# Patient Record
Sex: MALE | Race: WHITE | NOT HISPANIC OR LATINO | Employment: PART TIME | URBAN - METROPOLITAN AREA
[De-identification: names, ages, dates, MRNs, and addresses within clinical notes are randomized per-mention and may not be internally consistent; named-entity substitution may affect disease eponyms.]

---

## 2017-01-16 ENCOUNTER — OFFICE VISIT - HEALTHEAST (OUTPATIENT)
Dept: VASCULAR SURGERY | Facility: CLINIC | Age: 37
End: 2017-01-16

## 2017-01-16 DIAGNOSIS — I87.2 VENOUS INSUFFICIENCY: ICD-10-CM

## 2017-01-16 DIAGNOSIS — I83.813 VARICOSE VEINS WITH PAIN, BILATERAL: ICD-10-CM

## 2017-01-16 ASSESSMENT — MIFFLIN-ST. JEOR: SCORE: 2106.94

## 2017-04-24 ENCOUNTER — AMBULATORY - HEALTHEAST (OUTPATIENT)
Dept: VASCULAR SURGERY | Facility: CLINIC | Age: 37
End: 2017-04-24

## 2017-04-24 ENCOUNTER — RECORDS - HEALTHEAST (OUTPATIENT)
Dept: VASCULAR ULTRASOUND | Facility: CLINIC | Age: 37
End: 2017-04-24

## 2017-04-24 ENCOUNTER — OFFICE VISIT - HEALTHEAST (OUTPATIENT)
Dept: VASCULAR SURGERY | Facility: CLINIC | Age: 37
End: 2017-04-24

## 2017-04-24 ENCOUNTER — RECORDS - HEALTHEAST (OUTPATIENT)
Dept: ADMINISTRATIVE | Facility: OTHER | Age: 37
End: 2017-04-24

## 2017-04-24 ENCOUNTER — COMMUNICATION - HEALTHEAST (OUTPATIENT)
Dept: VASCULAR SURGERY | Facility: CLINIC | Age: 37
End: 2017-04-24

## 2017-04-24 DIAGNOSIS — I87.2 VENOUS INSUFFICIENCY: ICD-10-CM

## 2017-04-24 DIAGNOSIS — I87.2 VENOUS INSUFFICIENCY (CHRONIC) (PERIPHERAL): ICD-10-CM

## 2017-04-24 DIAGNOSIS — I83.813 VARICOSE VEINS OF BILATERAL LOWER EXTREMITIES WITH PAIN: ICD-10-CM

## 2017-04-24 DIAGNOSIS — I83.813 VARICOSE VEINS WITH PAIN, BILATERAL: ICD-10-CM

## 2017-04-24 ASSESSMENT — MIFFLIN-ST. JEOR: SCORE: 2106.94

## 2017-04-25 ENCOUNTER — AMBULATORY - HEALTHEAST (OUTPATIENT)
Dept: VASCULAR SURGERY | Facility: CLINIC | Age: 37
End: 2017-04-25

## 2017-04-26 ENCOUNTER — COMMUNICATION - HEALTHEAST (OUTPATIENT)
Dept: VASCULAR SURGERY | Facility: CLINIC | Age: 37
End: 2017-04-26

## 2017-05-01 ENCOUNTER — AMBULATORY - HEALTHEAST (OUTPATIENT)
Dept: VASCULAR SURGERY | Facility: CLINIC | Age: 37
End: 2017-05-01

## 2017-05-02 ENCOUNTER — COMMUNICATION - HEALTHEAST (OUTPATIENT)
Dept: VASCULAR SURGERY | Facility: CLINIC | Age: 37
End: 2017-05-02

## 2017-05-08 ENCOUNTER — RECORDS - HEALTHEAST (OUTPATIENT)
Dept: ADMINISTRATIVE | Facility: OTHER | Age: 37
End: 2017-05-08

## 2017-05-11 ENCOUNTER — RECORDS - HEALTHEAST (OUTPATIENT)
Dept: ADMINISTRATIVE | Facility: OTHER | Age: 37
End: 2017-05-11

## 2017-05-11 ENCOUNTER — RECORDS - HEALTHEAST (OUTPATIENT)
Dept: VASCULAR ULTRASOUND | Facility: CLINIC | Age: 37
End: 2017-05-11

## 2017-05-11 DIAGNOSIS — I83.813 VARICOSE VEINS OF BILATERAL LOWER EXTREMITIES WITH PAIN: ICD-10-CM

## 2017-05-11 DIAGNOSIS — I87.2 VENOUS INSUFFICIENCY (CHRONIC) (PERIPHERAL): ICD-10-CM

## 2017-05-15 ENCOUNTER — COMMUNICATION - HEALTHEAST (OUTPATIENT)
Dept: VASCULAR SURGERY | Facility: CLINIC | Age: 37
End: 2017-05-15

## 2017-05-16 ENCOUNTER — AMBULATORY - HEALTHEAST (OUTPATIENT)
Dept: VASCULAR SURGERY | Facility: CLINIC | Age: 37
End: 2017-05-16

## 2017-05-22 ENCOUNTER — AMBULATORY - HEALTHEAST (OUTPATIENT)
Dept: VASCULAR SURGERY | Facility: CLINIC | Age: 37
End: 2017-05-22

## 2017-09-19 ENCOUNTER — OFFICE VISIT - HEALTHEAST (OUTPATIENT)
Dept: VASCULAR SURGERY | Facility: CLINIC | Age: 37
End: 2017-09-19

## 2017-09-19 DIAGNOSIS — I83.893 SYMPTOMATIC VARICOSE VEINS OF BOTH LOWER EXTREMITIES: ICD-10-CM

## 2017-09-19 DIAGNOSIS — I87.2 VENOUS INSUFFICIENCY OF BOTH LOWER EXTREMITIES: ICD-10-CM

## 2018-06-05 ENCOUNTER — COMMUNICATION - HEALTHEAST (OUTPATIENT)
Dept: VASCULAR SURGERY | Facility: CLINIC | Age: 38
End: 2018-06-05

## 2018-08-20 ENCOUNTER — RECORDS - HEALTHEAST (OUTPATIENT)
Dept: ADMINISTRATIVE | Facility: OTHER | Age: 38
End: 2018-08-20

## 2019-05-30 ENCOUNTER — COMMUNICATION - HEALTHEAST (OUTPATIENT)
Dept: VASCULAR SURGERY | Facility: CLINIC | Age: 39
End: 2019-05-30

## 2019-06-04 ENCOUNTER — OFFICE VISIT - HEALTHEAST (OUTPATIENT)
Dept: VASCULAR SURGERY | Facility: CLINIC | Age: 39
End: 2019-06-04

## 2019-06-04 DIAGNOSIS — I83.893 SYMPTOMATIC VARICOSE VEINS OF BOTH LOWER EXTREMITIES: ICD-10-CM

## 2019-06-04 ASSESSMENT — MIFFLIN-ST. JEOR: SCORE: 2084.26

## 2019-06-13 ENCOUNTER — RECORDS - HEALTHEAST (OUTPATIENT)
Dept: LAB | Facility: CLINIC | Age: 39
End: 2019-06-13

## 2019-06-13 LAB
ANION GAP SERPL CALCULATED.3IONS-SCNC: 9 MMOL/L (ref 5–18)
BUN SERPL-MCNC: 14 MG/DL (ref 8–22)
CALCIUM SERPL-MCNC: 10.4 MG/DL (ref 8.5–10.5)
CHLORIDE BLD-SCNC: 101 MMOL/L (ref 98–107)
CHOLEST SERPL-MCNC: 240 MG/DL
CO2 SERPL-SCNC: 28 MMOL/L (ref 22–31)
CREAT SERPL-MCNC: 0.71 MG/DL (ref 0.7–1.3)
FASTING STATUS PATIENT QL REPORTED: NO
GFR SERPL CREATININE-BSD FRML MDRD: >60 ML/MIN/1.73M2
GLUCOSE BLD-MCNC: 135 MG/DL (ref 70–125)
HDLC SERPL-MCNC: 42 MG/DL
LDLC SERPL CALC-MCNC: 144 MG/DL
POTASSIUM BLD-SCNC: 4.4 MMOL/L (ref 3.5–5)
SODIUM SERPL-SCNC: 138 MMOL/L (ref 136–145)
TRIGL SERPL-MCNC: 271 MG/DL

## 2020-05-07 ENCOUNTER — COMMUNICATION - HEALTHEAST (OUTPATIENT)
Dept: SCHEDULING | Facility: CLINIC | Age: 40
End: 2020-05-07

## 2020-05-07 DIAGNOSIS — Z20.828 VIRAL DISEASE EXPOSURE: ICD-10-CM

## 2020-05-15 ENCOUNTER — AMBULATORY - HEALTHEAST (OUTPATIENT)
Dept: LAB | Facility: CLINIC | Age: 40
End: 2020-05-15

## 2020-05-15 DIAGNOSIS — Z20.828 VIRAL DISEASE EXPOSURE: ICD-10-CM

## 2020-05-18 ENCOUNTER — COMMUNICATION - HEALTHEAST (OUTPATIENT)
Dept: SCHEDULING | Facility: CLINIC | Age: 40
End: 2020-05-18

## 2020-08-26 ENCOUNTER — RECORDS - HEALTHEAST (OUTPATIENT)
Dept: LAB | Facility: CLINIC | Age: 40
End: 2020-08-26

## 2020-08-26 LAB
ALBUMIN SERPL-MCNC: 4.3 G/DL (ref 3.5–5)
ALP SERPL-CCNC: 54 U/L (ref 45–120)
ALT SERPL W P-5'-P-CCNC: 47 U/L (ref 0–45)
ANION GAP SERPL CALCULATED.3IONS-SCNC: 15 MMOL/L (ref 5–18)
AST SERPL W P-5'-P-CCNC: 19 U/L (ref 0–40)
BILIRUB SERPL-MCNC: 0.5 MG/DL (ref 0–1)
BUN SERPL-MCNC: 15 MG/DL (ref 8–22)
CALCIUM SERPL-MCNC: 9.5 MG/DL (ref 8.5–10.5)
CHLORIDE BLD-SCNC: 99 MMOL/L (ref 98–107)
CHOLEST SERPL-MCNC: 227 MG/DL
CO2 SERPL-SCNC: 25 MMOL/L (ref 22–31)
CREAT SERPL-MCNC: 0.93 MG/DL (ref 0.7–1.3)
FASTING STATUS PATIENT QL REPORTED: NO
GFR SERPL CREATININE-BSD FRML MDRD: >60 ML/MIN/1.73M2
GLUCOSE BLD-MCNC: 157 MG/DL (ref 70–125)
HDLC SERPL-MCNC: 44 MG/DL
LDLC SERPL CALC-MCNC: 149 MG/DL
POTASSIUM BLD-SCNC: 4.2 MMOL/L (ref 3.5–5)
PROT SERPL-MCNC: 7.7 G/DL (ref 6–8)
SODIUM SERPL-SCNC: 139 MMOL/L (ref 136–145)
TRIGL SERPL-MCNC: 168 MG/DL

## 2020-11-04 ENCOUNTER — OFFICE VISIT - HEALTHEAST (OUTPATIENT)
Dept: VASCULAR SURGERY | Facility: CLINIC | Age: 40
End: 2020-11-04

## 2020-11-04 DIAGNOSIS — I83.893 SYMPTOMATIC VARICOSE VEINS OF BOTH LOWER EXTREMITIES: ICD-10-CM

## 2020-11-04 ASSESSMENT — MIFFLIN-ST. JEOR: SCORE: 2093.33

## 2021-05-25 ENCOUNTER — RECORDS - HEALTHEAST (OUTPATIENT)
Dept: ADMINISTRATIVE | Facility: CLINIC | Age: 41
End: 2021-05-25

## 2021-05-26 ENCOUNTER — RECORDS - HEALTHEAST (OUTPATIENT)
Dept: ADMINISTRATIVE | Facility: CLINIC | Age: 41
End: 2021-05-26

## 2021-05-26 ENCOUNTER — RECORDS - HEALTHEAST (OUTPATIENT)
Dept: ADMINISTRATIVE | Facility: OTHER | Age: 41
End: 2021-05-26

## 2021-05-29 ENCOUNTER — RECORDS - HEALTHEAST (OUTPATIENT)
Dept: ADMINISTRATIVE | Facility: CLINIC | Age: 41
End: 2021-05-29

## 2021-05-29 NOTE — TELEPHONE ENCOUNTER
Called patient. He has not seen Dr Del Angel since 2017. We cannot refill prescription. Advised him to go to PCP for refill or he would have to make appt to see Dr Del Angel again. Pt preferred to see Dr. Del Angel.

## 2021-05-29 NOTE — PATIENT INSTRUCTIONS - HE
Swelling and Compression Therapy    Swelling in the legs can be caused by many reasons. No matter what the reason, treatment usually includes some type of compression. This may be done with a support sock, dressing, ace wrap, or layered wraps.     It is important to treat the swelling for many reasons. If the swelling is not treated you may develop blisters that can lead to ulcerations. This is caused when extra fluid goes into tissue causing damage and blocking blood flow to the tissue.     It is important that you wear your compression every day, including days that you will be seen in clinic.     Compression is often the most important part of treating leg wounds. Without controlling the swelling it is often not possible to heal wounds.     Going without compression for even brief periods of time can be damaging to your legs and your health.  Your compression should be put on first thing in the morning. Take the compression off at night only when instructed by your care provider to do so. Sometimes wearing compression 24 hours a day will be recommended.       If you are having difficulty wearing your compression it is important to notify your primary care provider so that other options may be reviewed.    Please call us if you have any questions [phone].    Thank you for choosing [facility].

## 2021-05-29 NOTE — TELEPHONE ENCOUNTER
Pt stopped by at the  vascular clinic at [facility]. Pt is requesting a refill request for compression stockings. Pt is hoping this will be done before 6/5/19. Please call pt once completed. Pt would like it sent to [facility] in [place].

## 2021-05-29 NOTE — PROGRESS NOTES
"[facility] Surgery Follow up    HPI:    39 y.o. year old male who returns for a follow up.     Allergies:Patient has no known allergies.    Past Medical History:   Diagnosis Date     Asperger syndrome      Asthma      GERD (gastroesophageal reflux disease)      Hypertension      Stricture of esophagus        Past Surgical History:   Procedure Laterality Date     ESOPHAGOGASTRODUODENOSCOPY N/A 11/22/2016    Procedure: ESOPHAGOGASTRODUODENOSCOPY;  Surgeon: Tom Seaman MD;  Location: [facility];  Service:      NOSE SURGERY         CURRENT MEDS:  Current Outpatient Medications on File Prior to Visit   Medication Sig Dispense Refill     fluticasone (FLONASE) 50 mcg/actuation nasal spray 1 spray.       mometasone (NASONEX) 50 mcg/actuation nasal spray into each nostril.       omeprazole (PRILOSEC) 40 MG capsule Take 40 mg by mouth daily before breakfast.       simvastatin (ZOCOR) 20 MG tablet Take 20 mg by mouth.       No current facility-administered medications on file prior to visit.        History reviewed. No pertinent family history.     reports that he has never smoked. He has never used smokeless tobacco. He reports that he drinks alcohol.    Review of Systems:  Negative except veins    OBJECTIVE:  Vitals:    06/04/19 1416   BP: 128/68   Patient Site: Left Arm   Patient Position: Sitting   Cuff Size: Adult Large   Pulse: 92   Resp: 16   Temp: 98.2  F (36.8  C)   TempSrc: Oral   Weight: (!) 240 lb (108.9 kg)   Height: 6' 3\" (1.905 m)     Body mass index is 30 kg/m .    EXAM:  GENERAL: This is a well-developed 39 y.o. male who appears his stated age  HEAD: normocephalic  HEENT: Pupils equal and reactive bilaterally  CARDIAC: RRR without murmur  CHEST/LUNG:  Clear to auscultation  ABDOMEN: Soft, nontender, nondistended, no masses    NEUROLOGIC: Focally intact, nonfocal  VASCULAR: Pulses intact, symmetrical upper and lower extremities,varicose veins bilateral        LABS:  Lab Results   Component Value Date    WBC " 7.9 02/26/2010    HGB 14.5 07/28/2015    HCT 45.2 02/26/2010    MCV 90 02/26/2010     02/26/2010     INR/Prothrombin Time      No results found for: HGBA1C  Lab Results   Component Value Date    ALT 22 11/10/2016    AST 13 11/10/2016    ALKPHOS 50 11/10/2016    BILITOT 0.3 11/10/2016            Assessment/Plan:   1. Symptomatic varicose veins of both lower extremities  Refill compression stockings    - Compression stockings      No follow-ups on file.     Dom Del Angel MD  [facility] Department of Surgery

## 2021-05-30 VITALS — WEIGHT: 245 LBS | BODY MASS INDEX: 30.62 KG/M2

## 2021-05-30 VITALS — WEIGHT: 245 LBS | HEIGHT: 75 IN | BODY MASS INDEX: 30.46 KG/M2

## 2021-05-30 VITALS — BODY MASS INDEX: 30.62 KG/M2 | HEIGHT: 75 IN

## 2021-05-31 ENCOUNTER — RECORDS - HEALTHEAST (OUTPATIENT)
Dept: ADMINISTRATIVE | Facility: CLINIC | Age: 41
End: 2021-05-31

## 2021-06-01 ENCOUNTER — RECORDS - HEALTHEAST (OUTPATIENT)
Dept: ADMINISTRATIVE | Facility: CLINIC | Age: 41
End: 2021-06-01

## 2021-06-01 VITALS — HEIGHT: 75 IN | BODY MASS INDEX: 30 KG/M2

## 2021-06-01 VITALS — WEIGHT: 240 LBS | BODY MASS INDEX: 30 KG/M2

## 2021-06-02 VITALS — BODY MASS INDEX: 29.84 KG/M2 | WEIGHT: 240 LBS | HEIGHT: 75 IN

## 2021-06-04 VITALS
HEART RATE: 84 BPM | BODY MASS INDEX: 30.09 KG/M2 | SYSTOLIC BLOOD PRESSURE: 156 MMHG | WEIGHT: 242 LBS | HEIGHT: 75 IN | TEMPERATURE: 99.1 F | RESPIRATION RATE: 24 BRPM | DIASTOLIC BLOOD PRESSURE: 64 MMHG

## 2021-06-08 NOTE — TELEPHONE ENCOUNTER
"Patient calling for antibody testing, believes he had covid in January.   Odilia Dillon     Patient is calling requesting COVID serologic antibody testing.  NOTE: Serologic testing is a blood test for 'antibodies' which are made at 10-14 days after you have had symptoms of COVID or were exposed and had an asymptomatic infection.  This does NOT test you for 'active' infection or tell you if you are contagious.    Are you a healthcare worker?  No  Do you have cough, fever, myalgias, or shortness of breath?  No  Were you exposed to a lab confirmed positive or possible case of COVID-19?  Possible exposure 14 days ago.  Possible exposure > 14 days ago.      The patient was informed: \"Testing is limited each day and it may take time for testing to be available to everyone who has called.  We will be calling you to schedule testing- please confirm the best number to reach you is [phone].\"    Lab order placed per COVID Serologic Testing standing orders.          "

## 2021-06-08 NOTE — PROGRESS NOTES
VASCULAR SURGERY CLINIC CONSULTATION    VASCULAR SURGEON: Tae Vaughan MD     LOCATION:  [facility]    Alisa Dubose   Medical Record #:  [id number]  YOB: 1980  Age:  37 y.o.     Date of Service: 1/16/2017    PRIMARY CARE PROVIDER: Francisco Quintanilla MD  Requesting Provider: Francisco Quintanilla MD     Reason for visit:  Bilateral varicose Veins    IMPRESSION:  Symptomatic varicose veins, bilaterally. Has not worn any compression.    RECOMMENDATION:    Knee high 20-30 mm Hg compression stockings  Aquaphor or similar ointment to feet and legs every night  Elevate the foot of bed 4-6 inches  Follow up with me in 3 months. If he is still having symptoms, will obtain a venous Duplex Study (with insufficiency).    HPI:  Alisa Dubose is a 37 y.o. male who was seen today in consultation for bilateral leg pain and discoloration. He has a long history of VV's. He has been developing skin breakdown over left medial inframalleolar region. + Numerous vv's. No history of VTE.No ulcers in legs. He has not worn any compression. No  Known  Thrombophilia.    PHH:    Past Medical History   Diagnosis Date     Asperger syndrome      Asthma      GERD (gastroesophageal reflux disease)      Hypertension         Past Surgical History   Procedure Laterality Date     Nose surgery       Esophagogastroduodenoscopy N/A 11/22/2016     Procedure: ESOPHAGOGASTRODUODENOSCOPY;  Surgeon: Tom Seaman MD;  Location: [facility];  Service:        ALLERGIES:  Review of patient's allergies indicates no known allergies.    MEDS:    Current Outpatient Prescriptions:      atenolol (TENORMIN) 25 MG tablet, Take 25 mg by mouth., Disp: , Rfl:      fluticasone (FLONASE) 50 mcg/actuation nasal spray, 1 spray., Disp: , Rfl:      omeprazole (PRILOSEC) 20 MG capsule, Take 20 mg by mouth., Disp: , Rfl:      simvastatin (ZOCOR) 20 MG tablet, Take 20 mg by mouth., Disp: , Rfl:     SOCIAL HABITS:    History   Smoking Status  "    Never Smoker   Smokeless Tobacco     Not on file       History   Alcohol Use     Yes       History   Drug Use Not on file       FAMILY HISTORY:  No family history on file.    REVIEW OF SYSTEMS:  12 point ros reviewed     PE:  Visit Vitals     /72 (Patient Site: Left Arm, Patient Position: Sitting, Cuff Size: Adult Regular)     Pulse 76     Temp 100.1  F (37.8  C) (Temporal)     Resp 16     Ht 6' 3\" (1.905 m)     Wt (!) 245 lb (111.1 kg)     BMI 30.62 kg/m2       HEENT:  nl   Chest:  nl  Lungs:  No wheezing  Heart:  RRR. + pulses palpable at car/rad/fem/pop/dp/pt.   Abd:  nl  Ext:  Numerous varicosities in lower exrtrremities from mid foot to knee level. These range in size from 1-6 mm in diameter.  Skin: small eschar left medial inframalleolar region--partial thickness.  Neuro:Slow affect      DIAGNOSTIC STUDIES:  None               Tae Vaughan MD  VASCULAR SURGERY     [facility]      "

## 2021-06-08 NOTE — PROGRESS NOTES
37 year old male, consult for bilateral varicose veins with pain. Patient has not worn compression and reports he has not had any imaging, venous proceudres, or Hx or DVT/PE.

## 2021-06-10 NOTE — PROGRESS NOTES
Follow-up, symptomatic varicose veins, patient has worn compression for greater than three months. Has venous insufficiency prior to appt.

## 2021-06-10 NOTE — PROGRESS NOTES
VASCULAR SURGERY CLINIC CONSULTATION    VASCULAR SURGEON: Tae Vaughan MD     LOCATION:  [facility]    Alisa Dubose   Medical Record #:  [id number]  YOB: 1980  Age:  37 y.o.     Date of Service: 4/24/2017    PRIMARY CARE PROVIDER: Francisco Quintanilla MD  Requesting Provider: Francisco Quintanilla MD     Reason for visit:  Bilateral varicose Veins    IMPRESSION:  Symptomatic varicose veins, bilaterally. Has now worn compression since January 2017 (3+ months) without resolution of pain in legs.    RECOMMENDATION:    Radiofrequency ablation of GSV, bilat. Because of dose of lidocaine, will do on 2 separate days.    I have discussed the natural history of varicose veins and venous insufficiency at length with patient. All options of management have been discussed. He realizes that the risks of  Intervention (RF ablation) include, but are not limited to infection, scarring, discoloration, pain, recurrent or residual varicose veins, neuropathy, anesthetic risks.      HPI:  Alisa Dubose is a 37 y.o. male who was seen today in consultation for bilateral leg pain and discoloration. He has a long history of VV's. He has been developing skin breakdown over left medial inframalleolar region. + Numerous vv's. No history of VTE.No ulcers in legs. He has now worn compression since I initially saw him in January, 2017. He has not had relief of symptoms since then. No  Known  Thrombophilia.    PHH:    Past Medical History:   Diagnosis Date     Asperger syndrome      Asthma      GERD (gastroesophageal reflux disease)      Hypertension         Past Surgical History:   Procedure Laterality Date     ESOPHAGOGASTRODUODENOSCOPY N/A 11/22/2016    Procedure: ESOPHAGOGASTRODUODENOSCOPY;  Surgeon: Tom Seaman MD;  Location: [facility];  Service:      NOSE SURGERY         ALLERGIES:  Review of patient's allergies indicates no known allergies.    MEDS:    Current Outpatient Prescriptions:       "atenolol (TENORMIN) 25 MG tablet, Take 25 mg by mouth., Disp: , Rfl:      fluticasone (FLONASE) 50 mcg/actuation nasal spray, 1 spray., Disp: , Rfl:      HYDROcodone-acetaminophen 5-325 mg per tablet, Take 1-2 tablets by mouth., Disp: , Rfl:      mometasone (NASONEX) 50 mcg/actuation nasal spray, into each nostril., Disp: , Rfl:      omeprazole (PRILOSEC) 20 MG capsule, Take 20 mg by mouth 2 (two) times a day. , Disp: , Rfl:      simvastatin (ZOCOR) 20 MG tablet, Take 20 mg by mouth., Disp: , Rfl:     Current Facility-Administered Medications:      lidocaine 1%-EPINEPHrine 1:100,000 112 mL, sodium bicarbonate 11.2 mL in sodium chloride 0.9% 1,000 mL (TUMESCENT), 1,000 mL, Irrigation, Q1H PRN, Tae WANG MD    SOCIAL HABITS:    History   Smoking Status     Never Smoker   Smokeless Tobacco     Not on file       History   Alcohol Use     Yes       History   Drug Use Not on file       FAMILY HISTORY:  No family history on file.    REVIEW OF SYSTEMS:  12 point ros reviewed     PE:  /78 (Patient Site: Left Arm, Patient Position: Sitting, Cuff Size: Adult Large)  Pulse 88  Resp 16  Ht 6' 3\" (1.905 m)  Wt (!) 245 lb (111.1 kg)  BMI 30.62 kg/m2    HEENT:  nl   Chest:  nl  Lungs:  No wheezing  Heart:  RRR. + pulses palpable at car/rad/fem/pop/dp/pt.   Abd:  nl  Ext:  Numerous varicosities in lower exrtrremities from mid foot to knee level. These range in size from 1-6 mm in diameter.  Skin: small eschar left medial inframalleolar region--partial thickness.  Neuro:Slow affect      DIAGNOSTIC STUDIES:   Personally reviewed, interpreted  and discussed with patient  [facility] OUTPATIENT     EXAM: BILATERAL LOWER EXTREMITY DEEP AND SUPERFICIAL VENOUS DUPLEX ULTRASOUND WITH PHYSIOLOGIC TESTING      INDICATION: Symptomatic varicose veins. Assess for incompetent veins.      TECHNIQUE: Supine and upright ultrasound of the deep and superficial veins with Valsalva and compression augmentation maneuvers. Duplex " imaging is performed utilizing gray-scale, two-dimensional images, color-flow imaging, Doppler waveform analysis, and   spectral Doppler imaging.      INCOMPETENCY CRITERIA: Deep vein reflux reported when greater than 1,000 ms flow reversal. Superficial vein reflux reported when greater than 600 ms flow reversal.  vein reflux reported as greater than 350 ms flow reversal.     RIGHT DEEP VEIN FINDINGS:   The common femoral and femoral veins are incompetent. The profunda femoris, popliteal and posterior tibial veins are competent. All vessels are patent and compressible without deep venous thrombus.     LEFT DEEP VEIN FINDINGS:   The common femoral, femoral and popliteal veins are incompetent. The profunda femoris and posterior tibial veins are competent. All vessels are patent and compressible without deep venous thrombus.     RIGHT SUPERFICIAL VEIN FINDINGS:  Great saphenous vein: Incompetent from the saphenofemoral junction to the distal calf. The vessel measures 8-13 mm above the knee and 5-9 mm below the knee.     Small saphenous vein: Competent from the saphenopopliteal junction to the mid calf.     No incompetent perforating veins or abnormal accessory veins identified.     LEFT SUPERFICIAL VEIN FINDINGS:  Great saphenous vein: Incompetent from the saphenofemoral junction to the mid calf and competent at the distal calf. The vessel measures 7-12 mm above the knee and 4-7 mm below the knee.     Small saphenous vein: Competent from the saphenopopliteal junction to the mid calf.     No incompetent perforating veins or abnormal accessory veins identified.     IMPRESSION:   CONCLUSION:   1. No deep venous thrombosis of either lower extremity. The right common femoral and femoral veins are incompetent. The left common femoral, femoral and popliteal veins are incompetent.  2. The right great saphenous vein is incompetent throughout. The right small saphenous vein is competent throughout.  3. The left great  saphenous vein is incompetent from the saphenofemoral junction to the mid calf and competent at the distal calf. The left small saphenous vein is competent throughout.             Tae Vaughan MD  VASCULAR SURGERY   [phone]

## 2021-06-10 NOTE — PROGRESS NOTES
Pt is here for his 2 week post op VNUS follow up. Pt has been wearing his thigh high compression everyday. Pt veins are collapsed and are have developed phlebitis. Some hardening of the veins on the left leg. Pt has hemosidern staining. We discussed pt going back to wearing his knee high compression stockings. Discussed the lotioning his legs and feet very well to prevent cracking and venous ulcerations. We discussed leg elevation, walking and calf pumps with standing. Pt will follow up in 4 months to see how he is doing and if all the veins have resolved. Encouraged pt to call with problems and he can return to work without any restrictions.

## 2021-06-10 NOTE — PROGRESS NOTES
Patient stopped by clinic today, had questions regarding compression stockings. Answered patients questions, reviewed compression and follow-up, provided additional order for compression with an [employer] catalog. Patient verbalized his understanding of information. Patient was previously given Tubigrip, was having increased issues with thigh high stockings. Provided patient with additional pairs.

## 2021-06-12 NOTE — PROGRESS NOTES
"[facility] Surgery Follow up    HPI:    40 y.o. year old male who returns for a follow up. Has undergone vein intervention. Doing ok, doesn't want interventions. Contnue compression and needs orders.     Allergies:Patient has no known allergies.    Past Medical History:   Diagnosis Date     Asperger syndrome      Asthma      GERD (gastroesophageal reflux disease)      Hypertension      Stricture of esophagus        Past Surgical History:   Procedure Laterality Date     ESOPHAGOGASTRODUODENOSCOPY N/A 11/22/2016    Procedure: ESOPHAGOGASTRODUODENOSCOPY;  Surgeon: Tom Seaman MD;  Location: [facility];  Service:      NOSE SURGERY         CURRENT MEDS:  Current Outpatient Medications on File Prior to Visit   Medication Sig Dispense Refill     fluticasone (FLONASE) 50 mcg/actuation nasal spray 1 spray.       mometasone (NASONEX) 50 mcg/actuation nasal spray into each nostril.       omeprazole (PRILOSEC) 40 MG capsule Take 40 mg by mouth daily before breakfast.       simvastatin (ZOCOR) 20 MG tablet Take 20 mg by mouth.       No current facility-administered medications on file prior to visit.        History reviewed. No pertinent family history.     reports that he has never smoked. He has never used smokeless tobacco. He reports current alcohol use.    Review of Systems:  Negative except varicose vein issues and history. Otherwise twelve system of review is negative.      OBJECTIVE:  Vitals:    11/04/20 0900   BP: 156/64   Patient Site: Right Arm   Patient Position: Sitting   Cuff Size: Adult Regular   Pulse: 84   Resp: 24   Temp: 99.1  F (37.3  C)   TempSrc: Oral   Weight: (!) 242 lb (109.8 kg)   Height: 6' 3\" (1.905 m)     Body mass index is 30.25 kg/m .    EXAM:  GENERAL: This is a well-developed 40 y.o. male who appears his stated age  HEAD: normocephalic  HEENT: Pupils equal and reactive bilaterally  CARDIAC: RRR without murmur  CHEST/LUNG:  Clear to auscultation  ABDOMEN: Soft, nontender, nondistended, no " masses    NEUROLOGIC: Focally intact, nonfocal  VASCULAR: Pulses intact, symmetrical upper and lower extremities.        LABS:  Lab Results   Component Value Date    WBC 7.9 02/26/2010    HGB 14.5 07/28/2015    HCT 45.2 02/26/2010    MCV 90 02/26/2010     02/26/2010     INR/Prothrombin Time      No results found for: HGBA1C  Lab Results   Component Value Date    ALT 47 (H) 08/26/2020    AST 19 08/26/2020    ALKPHOS 54 08/26/2020    BILITOT 0.5 08/26/2020            Assessment/Plan:   1. Symptomatic varicose veins of both lower extremities  Continue compression  Exercise and elevation  Weight ocntrol  Follow up as needed.    - Compression stockings      Return if symptoms worsen or fail to improve.     Dom Del Angel MD  [facility] Department of Surgery

## 2021-06-13 NOTE — PROGRESS NOTES
HPI: Pt is here for follow up of a Bilateral endovenous closure.  He is doing well. His appetite is good, and bowel function regular.  No fevers or chills. Ambulating without problems.       There were no vitals taken for this visit.      EXAM: This is a  37 y.o. MAN in no distress  GENERAL: Appears well  CHEST clear  CVS S1S2 NSR  ABDOMEN: Soft, non-tender.   EXT: warm, moves without difficulty, both of his legs look pretty decent and her large varicosities no spider veins noted.      Assessment/Plan:   1. Symptomatic varicose veins of both lower extremities  At this time point continue exercise sock use in the weight control  - Compression stockings    2. Venous insufficiency of both lower extremities  He develops bothersome right legs or issues with being problems he should come back and see his any time point.  Discussed with him and answered all questions today.  - Compression stockings    Dom Del Angel MD  [facility] Department of Surgery

## 2021-06-18 NOTE — PATIENT INSTRUCTIONS - HE
"Patient Instructions by Ken Del Angel RN at 11/4/2020  8:40 AM     Author: Ken Del Angel RN Service: -- Author Type: Registered Nurse    Filed: 11/4/2020  9:01 AM Encounter Date: 11/4/2020 Status: Signed    : Ken Del Angel RN (Registered Nurse)       We are prescribing some compression stockings for you. I have included different suppliers that should help you get measured and fitting to ensure proper fitting socks. You should wear this socks as much as you can. It is especially important to wear them with long periods of sitting/standing, long car rides or if you will be flying. Compression socks should get refilled every 4-6 months. They do not need to be worn at night while in bed.    If you do a lot of standing it is good to do calf raises to help keep the blood pumping. If you sit a lot at work it is good to get up periodically to walk around. Elevation of the foot of your bed 4-6\" helps the blood return back to where it is needed.        Varicose Veins      Varicose veins are swollen, enlarged veins most often found in the legs. They are usually blue or purple in color and may bulge, twist, and stand out under the skin.  Normally, veins return blood from the body to the heart. The leg veins have one-way valves that prevent blood from flowing backward in the vein. When the valves are weak or damaged, blood backs up in the veins. This may cause some of the veins to swell and bulge and become varicose veins.  Symptoms  Varicose veins may or may not cause symptoms. If symptoms do occur, they can include:    Legs that feel tired, achy, heavy, or itchy    Leg muscle cramps    Skin changes, such as discoloration, dryness, redness, or rash (in more severe cases, you may also have sores on the skin called venous leg ulcers)  Risk Factors  There are a number of factors that increase the risk for varicose veins. These can include:    Being a woman    Being older    Sitting or standing for long " periods    Being overweight    Being pregnant    Having a family history of varicose veins  Treatment begins with simple self-help measures (see below). If these dont help, there are many procedures that can be done to shrink or remove varicose veins. Your healthcare provider can tell you more about these options, if needed.  Home care    Support or compression stockings will likely be prescribed. If so, be sure to wear them as directed. They may help improve blood flow.    Exercising helps strengthen your leg muscles and improve blood flow. To get the most benefit, choose exercises such as walking, swimming, or cycling. Also try to exercise for at least 30 minutes on most days.    Raising (elevating) your legs lets gravity help blood flow back to the heart. Sit or lie with your feet above heart level a few times throughout the day, or as directed.    Avoid long periods of sitting or standing. Change positions often. Also, move your ankles, toes and knees often. This may also help improve blood flow.    If you are overweight, talk with your healthcare provider about setting up a weight-loss plan. Maintaining a healthy weight can help reduce the strain on your veins. It may also improve symptoms, such as swelling and aching.    If you have dryness and itching, ask your provider about special lotions that can be applied to the skin to help improve symptoms.  Follow-up care  Follow up with your healthcare provider, or as directed. If imaging tests were done, youll be told the results and if there are any new findings that affect your care.  When to seek medical advice  Call your healthcare provider right away if any of these occur:    Sudden, severe leg swelling, pain, or redness    Symptoms worsen, or they dont improve with self-care    Bleeding from any affected veins    Ulcers form on the legs, ankles, or feet    Fever of 100.4 F (38 C) or higher, or as advised by your provider      Understanding Spider and Varicose  Veins  Do you often hide your legs because of the way they look? You may have noticed tiny red or blue bursts (spider veins). Or maybe you have veins that bulge or look twisted (varicose veins). If so, there are treatments that can help  What are the symptoms?  Spider veins or varicose veins may never be a problem. But sometimes they can cause legs to ache or swell. Your legs may also feel heavy and tired, or like theyre burning. These symptoms may be more severe at the end of the day. Prolonged sitting or standing can also make your symptoms worse.  Who gets spider and varicose veins?  Anyone can get spider or varicose veins. But vein problems tend to be hereditary (run in families). Other factors that can affect veins include:    Pregnancy, hormones, and birth control pills    A job where you stand or sit a lot    Extra weight or lack of exercise    Age         Spider veins look like tiny webs on the ankles, legs, and upper thighs.       Ropy, dark blue, red, or flesh-colored varicose veins are most common on the thighs, calves, and feet.    What can be done?  Spider and varicose veins can affect the way you feel about yourself. Talk to your healthcare provider about your concerns. There are treatments that can ease symptoms and make your legs look better.  Your treatment choices  Treatment may include self-care, sclerotherapy (injecting veins with a chemical), surgery, or newer nonsurgical minimally invasive therapies. Spider veins and some varicose veins can be treated with sclerotherapy. Large varicose veins can often be treated with newer minimally invasive procedures and, in rare cases, surgery may be needed.     Please call [facility] to schedule an appointment. If you received a prescription please bring it with you to your appointment. You may call one of the locations below, although some locations are limited to what they carry.    Office Locations  New Locations  [facility]  [place]  Home Medical Equipment  [address], [place] [address]  [facility] (Formerly named [facility])  [address], [place] [address]  Phone [phone] /Fax [phone]        [place]   [address]   Medical Equipment [address]/Orthotics and Prosthetics [address]  [place] [address]   Phone: [phone]  Fax [phone]    [facility]  [address]  [place] [address]  Phone: [phone]  Fax: [phone]    [facility]   [address]  [place] [address]  Phone: [phone]  Fax: [phone]    [facility]  [address]  [place] [address]  Phone: [phone]  Fax: [phone]    [facility]  [address]  [address]  [place] [address]  Phone: [phone]  Fax: [phone]    [facility]  [address]   [place] [address]   Phone: [phone]  Fax: [phone]    [place]   [address] [place] [address]   Phone: [phone]  Fax: [phone]    [facility]  [address]  [place] [address]    [place] [phone]  [phone]  [place] [phone]  [email or web address]      [facility]   [address]             [address]                 [address]     [place] [address]      [place] [address]         [place] [address]  [phone]  [phone]     [phone]               Fax: [phone]  [email or web address]                                                     [facility]  [address] [place]  [address]  [phone]  [phone]  [email or web address]    Rm Baker  [phone]  [email or web address]    [facility]   [phone]    [place] [address] [place] [address] [phone]

## 2021-06-20 NOTE — LETTER
Letter by Mary Almaguer RN at      Author: Mary Almaguer RN Service: -- Author Type: --    Filed:  Encounter Date: 5/18/2020 Status: (Other)       5/18/2020        Alisa Dubose  [address]  [facility] [address]    COVID-19 Antibody Screen   Date Value Ref Range Status   05/15/2020 Negative  Final     Comment:     No COVID-19 antibodies detected.  Patients within 10 days of symptom onset for  COVID-19 may not produce sufficient levels of detectable antibodies.  Immunocompromised COVID-19 patients may take longer to develop antibodies.       You have tested NEGATIVE for COVID-19 antibodies. This suggests you have not had or been exposed to COVID-19. But it does not mean that for sure.    The test finds antibodies in most people 10 days after they get sick. For some people, it takes longer than 10 days for antibodies to show up. Others may never show antibodies against COVID-19, especially if they have weak immune systems.    If you have COVID-19 symptoms now, please stay home and away from others.     Your current symptoms may or may not be COVID-19.     What is antibody testing?  This is a kind of blood test. We take a small sample of your blood, and then test it for something called antibodies.   Your body makes antibodies to fight infection. If your blood has antibodies for a certain germ, it means youve been infected with that germ in the past.   Sometimes, antibodies stay in your body for years after youve had the infection. They can be there even if the germ didnt make you sick. They are a sign that your body fought off the infection.  Will this test find antibodies in everyone whos had COVID-19?  No. The test finds antibodies in most people 10 days after they get sick. For some people, it takes longer than 10 days for antibodies to show up. Others may never show antibodies against COVID-19, especially if they have weak immune systems.  What are the signs of COVID-19?  Signs of COVID-19  can appear from 2 to 14 days (up to 2 weeks) after youre infected. Some people have no symptoms or only mild symptoms. Others get very sick. The most common symptoms are:      Cough    Shortness of breath or trouble breathing    Or at least 2 of these symptoms:      Fever    Chills    Repeated shaking with chills    Muscle pain    Headache    Sore throat    Losing your sense of taste or smell    You may have other symptoms. Please contact your doctor or clinic for any symptoms that worry you.    Where can I get more information?     To learn the [facility] guidelines for staying home, please visit the [place] Department of Health website at [email or web address].[place]./diseases/coronavirus/basics.html    To learn more about COVID-19 and how to care for yourself at home, please visit the CDC website at https://www.cdc.gov/coronavirus/2019-ncov/about/steps-when-sick.html    For more options for care at [facility], please visit our website at [email or web address]    [facility] ([facility]) COVID-19 Hotline:  [phone]      You have tested NEGATIVE for COVID-19 antibodies. This suggests you have not had or been exposed to COVID-19. But it does not mean that for sure.   The test finds antibodies in most people 10 days after they get sick. For some people, it takes longer than 10 days for antibodies to show up. Others may never show antibodies against COVID-19, especially if they have weak immune systems.  If you have COVID-19 symptoms now, please stay home and away from others.   What is antibody testing?  This is a kind of blood test. We take a small sample of your blood, and then test it for something called antibodies.   Your body makes antibodies to fight infection. If your blood has antibodies for a certain germ, it means youve been infected with that germ in the past.   Sometimes, antibodies stay in your body for years after youve had the infection. They can be there even if  the germ didnt make you sick. They are a sign that your body fought off the infection.  Will this test find antibodies in everyone whos had COVID-19?  No. The test finds antibodies in most people 10 days after they get sick. For some people, it takes longer than 10 days for antibodies to show up. Others may never show antibodies against COVID-19, especially if they have weak immune systems.  What does it mean if the test finds COVID-19 antibodies?  If we find these antibodies, it suggests:     This person has had the virus.     Their bodys immune system fought the virus.   We dont know if this will help protect someone from getting COVID-19 again. Scientists are still learning about this.  What are the signs of COVID-19?  Signs of COVID-19 can appear from 2 to 14 days (up to 2 weeks) after youre infected. Some people have no symptoms or only mild symptoms. Others get very sick. The most common symptoms are:    Cough    Shortness of breath or trouble breathing      Or at least 2 of these symptoms:      Fever    Chills    Repeated shaking with chills    Muscle pain    Headache    Sore throat    Losing your sense of taste or smell    You may have other symptoms. Please contact your doctor or clinic for any symptoms that worry you.    Where can I get more information?     To learn the [facility] guidelines for staying home, please visit the [place] Department of Health website at https://www.health.[facility].[place].us/diseases/coronavirus/basics.html    To learn more about COVID-19 and how to care for yourself at home, please visit the CDC website at https://www.cdc.gov/coronavirus/2019-ncov/about/steps-when-sick.html    For more options for care at [facility], please visit our website at [email or web address]    [facility] ([facility]) COVID-19 Hotline:  [phone]

## 2021-07-03 NOTE — ADDENDUM NOTE
Addendum Note by Ken Del Angel RN at 5/2/2017  3:24 PM     Author: Ken Del Angel RN Service: -- Author Type: Registered Nurse    Filed: 5/2/2017  3:24 PM Encounter Date: 4/24/2017 Status: Signed    : Ken Del Angel RN (Registered Nurse)    Addended by: KEN DEL ANGEL on: 5/2/2017 03:24 PM        Modules accepted: Orders, SmartSet         no

## 2021-07-23 ENCOUNTER — LAB REQUISITION (OUTPATIENT)
Dept: LAB | Facility: CLINIC | Age: 41
End: 2021-07-23
Payer: MEDICARE

## 2021-07-23 DIAGNOSIS — R25.2 CRAMP AND SPASM: ICD-10-CM

## 2021-07-23 LAB
ALBUMIN SERPL-MCNC: 4.2 G/DL (ref 3.5–5)
ALP SERPL-CCNC: 53 U/L (ref 45–120)
ALT SERPL W P-5'-P-CCNC: 30 U/L (ref 0–45)
ANION GAP SERPL CALCULATED.3IONS-SCNC: 10 MMOL/L (ref 5–18)
AST SERPL W P-5'-P-CCNC: 13 U/L (ref 0–40)
BILIRUB SERPL-MCNC: 0.3 MG/DL (ref 0–1)
BUN SERPL-MCNC: 12 MG/DL (ref 8–22)
CALCIUM SERPL-MCNC: 9.9 MG/DL (ref 8.5–10.5)
CHLORIDE BLD-SCNC: 98 MMOL/L (ref 98–107)
CO2 SERPL-SCNC: 28 MMOL/L (ref 22–31)
CREAT SERPL-MCNC: 0.73 MG/DL (ref 0.7–1.3)
GFR SERPL CREATININE-BSD FRML MDRD: >90 ML/MIN/1.73M2
GLUCOSE BLD-MCNC: 118 MG/DL (ref 70–125)
MAGNESIUM SERPL-MCNC: 2.1 MG/DL (ref 1.8–2.6)
POTASSIUM BLD-SCNC: 4.1 MMOL/L (ref 3.5–5)
PROT SERPL-MCNC: 7.7 G/DL (ref 6–8)
SODIUM SERPL-SCNC: 136 MMOL/L (ref 136–145)

## 2021-07-23 PROCEDURE — 83735 ASSAY OF MAGNESIUM: CPT | Mod: ORL | Performed by: PHYSICIAN ASSISTANT

## 2021-07-23 PROCEDURE — 80053 COMPREHEN METABOLIC PANEL: CPT | Mod: ORL | Performed by: PHYSICIAN ASSISTANT

## 2021-10-19 NOTE — LETTER
Letter by Dom Del Angel MD at      Author: Dom Del Angel MD Service: -- Author Type: --    Filed:  Encounter Date: 6/4/2019 Status: (Other)         Francisco Quintanilla MD  [address]  [facility] [address]                                  June 7, 2019    Patient: Alisa Dubose   MR Number: [id number]   YOB: 1980   Date of Visit: 6/4/2019     Dear Dr. Dwight MD:    Thank you for referring Alisa Dubose to me for evaluation. Below are the relevant portions of my assessment and plan of care.    If you have questions, please do not hesitate to call me. I look forward to following Alisa along with you.    Sincerely,        Dom Del Angel MD          CC  No Recipients  Dom Del Angel MD  6/7/2019  8:03 PM  Sign at close encounter  [facility] Follow up    HPI:    39 y.o. year old male who returns for a follow up.     Allergies:Patient has no known allergies.    Past Medical History:   Diagnosis Date   ? Asperger syndrome    ? Asthma    ? GERD (gastroesophageal reflux disease)    ? Hypertension    ? Stricture of esophagus        Past Surgical History:   Procedure Laterality Date   ? ESOPHAGOGASTRODUODENOSCOPY N/A 11/22/2016    Procedure: ESOPHAGOGASTRODUODENOSCOPY;  Surgeon: Tom Seaman MD;  Location: [facility];  Service:    ? NOSE SURGERY         CURRENT MEDS:  Current Outpatient Medications on File Prior to Visit   Medication Sig Dispense Refill   ? fluticasone (FLONASE) 50 mcg/actuation nasal spray 1 spray.     ? mometasone (NASONEX) 50 mcg/actuation nasal spray into each nostril.     ? omeprazole (PRILOSEC) 40 MG capsule Take 40 mg by mouth daily before breakfast.     ? simvastatin (ZOCOR) 20 MG tablet Take 20 mg by mouth.       No current facility-administered medications on file prior to visit.        History reviewed. No pertinent family history.     reports that he has never smoked. He has never used smokeless tobacco.  "He reports that he drinks alcohol.    Review of Systems:  Negative except veins    OBJECTIVE:  Vitals:    06/04/19 1416   BP: 128/68   Patient Site: Left Arm   Patient Position: Sitting   Cuff Size: Adult Large   Pulse: 92   Resp: 16   Temp: 98.2  F (36.8  C)   TempSrc: Oral   Weight: (!) 240 lb (108.9 kg)   Height: 6' 3\" (1.905 m)     Body mass index is 30 kg/m .    EXAM:  GENERAL: This is a well-developed 39 y.o. male who appears his stated age  HEAD: normocephalic  HEENT: Pupils equal and reactive bilaterally  CARDIAC: RRR without murmur  CHEST/LUNG:  Clear to auscultation  ABDOMEN: Soft, nontender, nondistended, no masses    NEUROLOGIC: Focally intact, nonfocal  VASCULAR: Pulses intact, symmetrical upper and lower extremities,varicose veins bilateral        LABS:  Lab Results   Component Value Date    WBC 7.9 02/26/2010    HGB 14.5 07/28/2015    HCT 45.2 02/26/2010    MCV 90 02/26/2010     02/26/2010     INR/Prothrombin Time      No results found for: HGBA1C  Lab Results   Component Value Date    ALT 22 11/10/2016    AST 13 11/10/2016    ALKPHOS 50 11/10/2016    BILITOT 0.3 11/10/2016            Assessment/Plan:   1. Symptomatic varicose veins of both lower extremities  Refill compression stockings    - Compression stockings      No follow-ups on file.     Dom Del Angel MD  [facility] Department of Surgery       " 36.9

## 2022-08-04 ENCOUNTER — HOSPITAL ENCOUNTER (EMERGENCY)
Facility: CLINIC | Age: 42
Discharge: HOME | End: 2022-08-04
Admitting: PHYSICIAN ASSISTANT
Payer: OTHER MISCELLANEOUS

## 2022-08-04 ENCOUNTER — APPOINTMENT (OUTPATIENT)
Dept: RADIOLOGY | Facility: CLINIC | Age: 42
End: 2022-08-04
Attending: STUDENT IN AN ORGANIZED HEALTH CARE EDUCATION/TRAINING PROGRAM
Payer: OTHER MISCELLANEOUS

## 2022-08-04 VITALS
WEIGHT: 250 LBS | OXYGEN SATURATION: 99 % | DIASTOLIC BLOOD PRESSURE: 79 MMHG | HEART RATE: 84 BPM | SYSTOLIC BLOOD PRESSURE: 136 MMHG | HEIGHT: 75 IN | TEMPERATURE: 97.4 F | RESPIRATION RATE: 16 BRPM | BODY MASS INDEX: 31.08 KG/M2

## 2022-08-04 DIAGNOSIS — S82.042A CLOSED DISPLACED COMMINUTED FRACTURE OF LEFT PATELLA, INITIAL ENCOUNTER: ICD-10-CM

## 2022-08-04 PROCEDURE — 27520 TREAT KNEECAP FRACTURE: CPT | Mod: LT

## 2022-08-04 PROCEDURE — 73560 X-RAY EXAM OF KNEE 1 OR 2: CPT | Mod: LT

## 2022-08-04 PROCEDURE — 99284 EMERGENCY DEPT VISIT MOD MDM: CPT | Mod: 25

## 2022-08-04 RX ORDER — HYDROCODONE BITARTRATE AND ACETAMINOPHEN 5; 325 MG/1; MG/1
1 TABLET ORAL EVERY 6 HOURS PRN
Qty: 12 TABLET | Refills: 0 | Status: SHIPPED | OUTPATIENT
Start: 2022-08-04 | End: 2022-08-07

## 2022-08-04 NOTE — ED PROVIDER NOTES
EMERGENCY DEPARTMENT ENCOUNTER      NAME: Alisa Dubose  AGE: 42 year old male  YOB: 1980  MRN: [id number]  EVALUATION DATE & TIME: 8/4/2022 11:15 AM    PCP: Clinic, [facility]    ED PROVIDER: Epifanio Wilcox PA-C      Chief Complaint   Patient presents with     Knee Injury         FINAL IMPRESSION:  1. Closed displaced comminuted fracture of left patella, initial encounter          MEDICAL DECISION MAKING:    Pertinent Labs & Imaging studies reviewed. (See chart for details)  42 year old male presents to the Emergency Department for evaluation of left knee pain.  Injury occurred on the job.    After obtaining history present illness, reviewing vitals, examined the patient, I was also able to review x-ray as well as results that was previously ordered prior to my assessment.  This did reveal a comminuted mildly displaced left patella fracture with a large knee effusion.  No obvious fracture of the femur tibia or fibula.  Currently on examination there is no focal joint line tenderness, only overlying tenderness of the patella.    At this point time I did not feel that CT imaging was necessary.  Plan to treat with immobilization, crutches, referral for outpatient orthopedic follow-up.  I did inform the patient that this will likely need surgery.  I have provided the patient with a work note.        ED COURSE    I met with the patient, obtained history, performed an initial exam, and discussed options and plan for diagnostics and treatment here in the ED.    At the conclusion of the encounter I discussed the results of all of the tests and the disposition. The questions were answered. The patient or family acknowledged understanding and was agreeable with the care plan.     MEDICATIONS GIVEN IN THE EMERGENCY:  Medications - No data to display    NEW PRESCRIPTIONS STARTED AT TODAY'S ER VISIT  New Prescriptions    HYDROCODONE-ACETAMINOPHEN (NORCO) 5-325 MG TABLET    Take 1 tablet by mouth  every 6 hours as needed for pain            =================================================================    HPI    Patient information was obtained from: Patient  Alisa Dubose is a 42 year old male who presents to this ED for evaluation of left knee injury.  Patient reports that he was at work when he had a fall/injury that caused him to land directly on the ground striking his left knee on a hard surface.  This happened approximately 9:15 AM.  Patient reports that since then he has not been able to bear weight and any type of movement of the knee causes severe pain.  No additional injuries to complain of.          PAST MEDICAL HISTORY:  Past Medical History:   Diagnosis Date     Asperger syndrome      Asperger syndrome      Asthma      Eosinophilic esophagitis      Gastric ulcer      Gastric ulcer      GERD (gastroesophageal reflux disease)      Hypercholesteremia      Hypertension      Hypertension      Stricture of esophagus      Varicose veins        PAST SURGICAL HISTORY:  Past Surgical History:   Procedure Laterality Date     ESOPHAGOSCOPY, GASTROSCOPY, DUODENOSCOPY (EGD), COMBINED N/A 11/22/2016    Procedure: ESOPHAGOGASTRODUODENOSCOPY;  Surgeon: Tom Seaman MD;  Location: [facility];  Service:      NOSE SURGERY       TEAR DUCT SURGERY  10/1981         CURRENT MEDICATIONS:    No current facility-administered medications for this encounter.    Current Outpatient Medications:      HYDROcodone-acetaminophen (NORCO) 5-325 MG tablet, Take 1 tablet by mouth every 6 hours as needed for pain, Disp: 12 tablet, Rfl: 0     atenolol (TENORMIN) 25 MG tablet, Take 25 mg by mouth daily, Disp: , Rfl:      omeprazole 20 MG tablet, Take 20 mg by mouth daily, Disp: , Rfl:      simvastatin (ZOCOR) 20 MG tablet, Take 20 mg by mouth At Bedtime, Disp: , Rfl:       ALLERGIES:  No Known Allergies    FAMILY HISTORY:  No family history on file.    SOCIAL HISTORY:   Social History     Socioeconomic History     Marital  "status: Single   Tobacco Use     Smoking status: Never Smoker     Smokeless tobacco: Never Used   Substance and Sexual Activity     Alcohol use: Yes       VITALS:  Patient Vitals for the past 24 hrs:   BP Temp Temp src Pulse Resp SpO2 Height Weight   08/04/22 1024 (!) 145/84 97.4  F (36.3  C) Temporal 84 16 100 % 1.905 m (6' 3\") 113.4 kg (250 lb)       PHYSICAL EXAM    Physical Exam  Vitals and nursing note reviewed.   Constitutional:       General: He is not in acute distress.     Appearance: He is normal weight. He is not toxic-appearing.   HENT:      Head: Normocephalic.   Eyes:      Conjunctiva/sclera: Conjunctivae normal.   Cardiovascular:      Rate and Rhythm: Normal rate.   Pulmonary:      Effort: Pulmonary effort is normal. No respiratory distress.   Musculoskeletal:      Comments: Patient has visible bruising swelling and direct tenderness to touch overlying the left knee specifically the patella.  No breaks in the skin/laceration.  There is no joint line tenderness to palpation involving the left knee.  Patient can have axial loading of the tibia into the femur without severe pain.  Any type of flexion extension does seem to cause increasing knee pain which would be consistent with a patella fracture.  Left ankle left foot exam is benign.  Remainder musculoskeletal exam is benign.   Skin:     General: Skin is warm and dry.      Comments: Minor bruising and abrasion anterior left knee.   Neurological:      General: No focal deficit present.      Mental Status: He is alert. Mental status is at baseline.      Sensory: No sensory deficit.      Motor: No weakness.   Psychiatric:         Mood and Affect: Mood normal.          LAB:  All pertinent labs reviewed and interpreted.  Results for orders placed or performed during the hospital encounter of 08/04/22   XR Knee Left 1/2 Views    Impression    IMPRESSION: Comminuted mildly displaced left patella fracture. Moderate to large-sized left knee joint effusion. " Anatomic alignment left knee. No definitive distal femur, proximal tibia, or proximal fibula fracture.    NOTE: ABNORMAL REPORT    THE DICTATION ABOVE DESCRIBES AN ABNORMALITY FOR WHICH FOLLOW-UP IS NEEDED.                        RADIOLOGY:  Reviewed all pertinent imaging. Please see official radiology report.  XR Knee Left 1/2 Views   Preliminary Result   IMPRESSION: Comminuted mildly displaced left patella fracture. Moderate to large-sized left knee joint effusion. Anatomic alignment left knee. No definitive distal femur, proximal tibia, or proximal fibula fracture.      NOTE: ABNORMAL REPORT      THE DICTATION ABOVE DESCRIBES AN ABNORMALITY FOR WHICH FOLLOW-UP IS NEEDED.                                 Epifanio Wilcox PA-C  Emergency Medicine  [facility]     Epifanio Wilcox PA-C  08/04/22 1483

## 2022-08-04 NOTE — ED TRIAGE NOTES
Pt arrives to ED with c/o left knee injury that occurred at work today. Pt states he fell landing on to concrete. Did not hit head no LOC and not on thinners.      Triage Assessment     Row Name 08/04/22 1025       Triage Assessment (Adult)    Airway WDL WDL       Respiratory WDL    Respiratory WDL WDL       Skin Circulation/Temperature WDL    Skin Circulation/Temperature WDL WDL       Cardiac WDL    Cardiac WDL WDL       Peripheral/Neurovascular WDL    Peripheral Neurovascular WDL WDL       Cognitive/Neuro/Behavioral WDL    Cognitive/Neuro/Behavioral WDL WDL

## 2022-08-04 NOTE — DISCHARGE INSTRUCTIONS
Today you fractured your kneecap.  Please wear the knee immobilizer is much as possible and use the crutches to nonweightbearing on the left leg.  I have placed an outpatient referral to our orthopedic specialist, they should contact you to set up an appointment.  This is an injury that would likely require surgery.  Please focus on rest and ice.  You may use ibuprofen or Tylenol over-the-counter.  I have prescribed a stronger pain pill that can be used occasionally for breakthrough pain.

## 2022-09-24 ENCOUNTER — HOSPITAL ENCOUNTER (EMERGENCY)
Facility: CLINIC | Age: 42
Discharge: HOME | End: 2022-09-24
Attending: EMERGENCY MEDICINE | Admitting: EMERGENCY MEDICINE
Payer: MEDICARE

## 2022-09-24 VITALS
DIASTOLIC BLOOD PRESSURE: 70 MMHG | RESPIRATION RATE: 20 BRPM | SYSTOLIC BLOOD PRESSURE: 156 MMHG | OXYGEN SATURATION: 100 % | HEART RATE: 96 BPM

## 2022-09-24 DIAGNOSIS — M25.562 LEFT KNEE PAIN, UNSPECIFIED CHRONICITY: ICD-10-CM

## 2022-09-24 PROCEDURE — 250N000013 HC RX MED GY IP 250 OP 250 PS 637: Performed by: EMERGENCY MEDICINE

## 2022-09-24 PROCEDURE — 99283 EMERGENCY DEPT VISIT LOW MDM: CPT

## 2022-09-24 RX ORDER — ACETAMINOPHEN 325 MG/1
650 TABLET ORAL ONCE
Status: COMPLETED | OUTPATIENT
Start: 2022-09-24 | End: 2022-09-24

## 2022-09-24 RX ADMIN — ACETAMINOPHEN 650 MG: 325 TABLET ORAL at 17:40

## 2022-09-24 ASSESSMENT — ACTIVITIES OF DAILY LIVING (ADL)
ADLS_ACUITY_SCORE: 35

## 2022-09-24 NOTE — CONSULTS
ED provider requested this writer meet with patient who lives alone in an apartment. Patient reports that he had a fall on August 4, 2022 and broke his knee. He has been wearing a brace prescribed to him by [facility].  Patient had been receiving home PT/OT and HHA through [employer], provided by [employer] but this was terminated on 09/14/22. Patient has been using crutches.    Patient also states his thigh is painful and that his foot feels numb intermittently. He takes Tylenol three times a day which helps with the pain. However, he s having problems sleeping because the pain wakes him up, so he started taking Nyquil before he goes to sleep. ED Provider made aware of medications patient says he is taking.    With patient consent this writer contacted patient s father Chalo ([phone]). Chalo states that patient is autistic and worked at a [employer] and that s where he fell. Chalo took patient to doctor on 09/20/22 and the doctor reordered physical therapy to continue sometime next week. Updated Chalo that ED Provider is prescribing a different brace and a walker which may be more comfortable for patient.    Chalo will contact doctor on Monday to verify if all the home services are being reinstated or if it s only physical therapy. Chalo will also call patient s advocate on Monday and make them aware that patient needs to have home services reinstated. Chalo would like patient to be discharge back to patient's apartment. ED Provider and ED RN updated.      CLEO RUTH, CROW/CM

## 2022-09-24 NOTE — ED PROVIDER NOTES
EMERGENCY DEPARTMENT ENCOUNTER      NAME: Alisa Dubose  AGE: 42 year old male  YOB: 1980  MRN: [id number]  EVALUATION DATE & TIME: 9/24/2022  5:15 PM    PCP: Clinic, [facility] [place]    ED PROVIDER: Danielle Guevara M.D.      Chief Complaint   Patient presents with     Knee Pain         FINAL IMPRESSION:  1. Left knee pain, unspecified chronicity          ED COURSE & MEDICAL DECISION MAKING:    ED Course as of 09/24/22 1804   Sat Sep 24, 2022   1731 Pt with continued calls to 911 with cognitive impairment with ongoing left knee throbbing pain when he walks on it with brace because his knee immobilizer brace straps are uncomfortable and he feels like they are digging into his leg. Pt amenable to try regular non-hinged knee immobilizer with crutches (left his at home), tylenol (missed his 4pm dose), and care coordinator Rhona reviewed case as he notes he doesn't have help at home, he was discharged from care coordination 10 days ago, she will investigate home care options   1751 Rhona care coordinator investigated case and found patient has worker's comp case with question re: payment of ongoing care, he is anxious/irritable/restless at home from work, out of work since injury. She is calling patient's father to discuss PCA options, knee brace changed to temporary/short term knee immobilzer as pain he c/o is primarily pain to thigh strap region, will give ortho 2-day follow up to discuss immobilizer/knee bracing options.   1801 Rhona from  spoke with patient's father, were at workman's comp Tuesday, plans to resume physical therapy, and father has resources to call for services at home restart Monday and patient does also have a patient care advocate, father calling advocate to increase home services. Patient discharged after being provided with extensive anticipatory guidance and given return precautions, importance of PMD follow-up emphasized.        Pertinent Labs & Imaging studies reviewed.  (See chart for details)    Gloves  Procedure mask  COVID PPE      At the conclusion of the encounter I discussed the results of all of the tests and the disposition. The questions were answered. The patient or family acknowledged understanding and was agreeable with the care plan.     MEDICATIONS GIVEN IN THE EMERGENCY:  Medications   acetaminophen (TYLENOL) tablet 650 mg (650 mg Oral Given 9/24/22 1740)       NEW PRESCRIPTIONS STARTED AT TODAY'S ER VISIT  New Prescriptions    No medications on file          =================================================================    HPI      Alisa Dubose is a 42 year old male with PMHx of autism and left patellar fracture in August 2022 who presents to the ED today via ambulance with knee pain    Per chart review,  8/4/2022: Patient presented to ED for knee pain after a fall at work. Xray revealed a comminuted mildly displaced left patella fracture with a large knee effusion. No obvious fracture of the femur tibia or fibula. No focal joint line tenderness, only overlying tenderness of the patella. Treated with immobilization, crutches, and referral for orthopedic follow up. Patient was informed knee will likely need surgery. Discharged with a prescription for Norco.    Per EMS,  Patient has left knee pain following a patellar injury on 8/4. Patient reports trouble getting around his apartment. He has been calling 911 frequently for his knee pain.    Per patient,  Patient reports left knee pain following a patellar fracture in August. He rates his pain at 9.5/10 and describes the pain as throbbing that worsens with walking. He reports that when he lifts his knee up, the strap on his leg brace tightens and makes it uncomfortable. Patient reports seeing his doctor on 9/20 and that his knee is healing slowly. Patient reports he called 911 due to the pain and that it is hard to do his recovery tasks at home.  Patient lives independently. He is able to get around with  crutches but notes that it is difficult to walk. Patient denies having a personal care attendant. He denies any new falls or injuries. He has been managing his pain with tylenol, his most recent dose was at 12:00 PM. Patient denies any other complaints at this time.    REVIEW OF SYSTEMS   All other systems reviewed and are negative except as noted above in HPI.    PAST MEDICAL HISTORY:  Past Medical History:   Diagnosis Date     Asperger syndrome      Asperger syndrome      Asthma      Eosinophilic esophagitis      Gastric ulcer      Gastric ulcer      GERD (gastroesophageal reflux disease)      Hypercholesteremia      Hypertension      Hypertension      Stricture of esophagus      Varicose veins        PAST SURGICAL HISTORY:  Past Surgical History:   Procedure Laterality Date     ESOPHAGOSCOPY, GASTROSCOPY, DUODENOSCOPY (EGD), COMBINED N/A 11/22/2016    Procedure: ESOPHAGOGASTRODUODENOSCOPY;  Surgeon: Tom Seaman MD;  Location: [facility];  Service:      NOSE SURGERY       TEAR DUCT SURGERY  10/1981       CURRENT MEDICATIONS:    atenolol (TENORMIN) 25 MG tablet  omeprazole 20 MG tablet  simvastatin (ZOCOR) 20 MG tablet        ALLERGIES:  No Known Allergies    FAMILY HISTORY:  No family history on file.    SOCIAL HISTORY:   Social History     Socioeconomic History     Marital status: Single   Tobacco Use     Smoking status: Never Smoker     Smokeless tobacco: Never Used   Substance and Sexual Activity     Alcohol use: Yes       VITALS:  Patient Vitals for the past 24 hrs:   BP Pulse Resp SpO2   09/24/22 1724 (!) 156/70 96 20 100 %   09/24/22 1719 (!) 156/70 98 -- --       PHYSICAL EXAM    GENERAL: Awake, alert.  In no acute distress.   HEENT: Normocephalic, atraumatic.  Pupils equal, round and reactive.  Conjunctiva normal.  EOMI.  NECK: No stridor or apparent deformity.  PULMONARY: Symmetrical breath sounds without distress.  Lungs clear to auscultation bilaterally without wheezes, rhonchi or rales.  CARDIO:  Regular rate and rhythm.  No significant murmur, rub or gallop.  Radial pulses strong and symmetrical.  ABDOMINAL: Abdomen soft, non-distended and non-tender to palpation.  No CVAT, no palpable hepatosplenomegaly.  EXTREMITIES: No lower extremity swelling or edema. Inferior medial left peripatellar region tender   NEURO: Alert and oriented to person, place and time.  Cranial nerves grossly intact.  No focal motor deficit.  PSYCH: Normal mood and affect  SKIN: No rashes            I, Leanna Aguilar, am serving as a scribe to document services personally performed by Dr. Danielle Guevara based on my observation and the provider's statements to me. I, Danielle Guevara MD attest that Leanna Aguilar is acting in a scribe capacity, has observed my performance of the services and has documented them in accordance with my direction.       Danielle Guevara MD  09/24/22 8238

## 2022-09-24 NOTE — ED TRIAGE NOTES
Brought in via [facility] for Lt knee pain. Pt states that he had a patellar fracture in August 2022 and has had ongoing pain since then. Pt is wearing his leg brace. MD in for primary assessment.     Triage Assessment     Row Name 09/24/22 9016       Triage Assessment (Adult)    Airway WDL WDL       Respiratory WDL    Respiratory WDL WDL       Skin Circulation/Temperature WDL    Skin Circulation/Temperature WDL WDL       Cardiac WDL    Cardiac WDL WDL       Peripheral/Neurovascular WDL    Peripheral Neurovascular WDL WDL       Cognitive/Neuro/Behavioral WDL    Cognitive/Neuro/Behavioral WDL WDL

## 2023-03-13 ENCOUNTER — APPOINTMENT (OUTPATIENT)
Dept: CT IMAGING | Facility: CLINIC | Age: 43
End: 2023-03-13
Attending: EMERGENCY MEDICINE
Payer: COMMERCIAL

## 2023-03-13 ENCOUNTER — HOSPITAL ENCOUNTER (EMERGENCY)
Facility: CLINIC | Age: 43
Discharge: HOME | End: 2023-03-13
Attending: EMERGENCY MEDICINE | Admitting: EMERGENCY MEDICINE
Payer: COMMERCIAL

## 2023-03-13 VITALS
TEMPERATURE: 98.7 F | RESPIRATION RATE: 18 BRPM | HEIGHT: 75 IN | WEIGHT: 220 LBS | DIASTOLIC BLOOD PRESSURE: 80 MMHG | BODY MASS INDEX: 27.35 KG/M2 | SYSTOLIC BLOOD PRESSURE: 137 MMHG | OXYGEN SATURATION: 99 % | HEART RATE: 87 BPM

## 2023-03-13 DIAGNOSIS — R09.A2 FOREIGN BODY SENSATION IN THROAT: ICD-10-CM

## 2023-03-13 LAB
ANION GAP SERPL CALCULATED.3IONS-SCNC: 11 MMOL/L (ref 5–18)
BASOPHILS # BLD AUTO: 0 10E3/UL (ref 0–0.2)
BASOPHILS NFR BLD AUTO: 1 %
BUN SERPL-MCNC: 8 MG/DL (ref 8–22)
CALCIUM SERPL-MCNC: 9.5 MG/DL (ref 8.5–10.5)
CHLORIDE BLD-SCNC: 103 MMOL/L (ref 98–107)
CO2 SERPL-SCNC: 25 MMOL/L (ref 22–31)
CREAT SERPL-MCNC: 0.72 MG/DL (ref 0.7–1.3)
EOSINOPHIL # BLD AUTO: 0.5 10E3/UL (ref 0–0.7)
EOSINOPHIL NFR BLD AUTO: 6 %
ERYTHROCYTE [DISTWIDTH] IN BLOOD BY AUTOMATED COUNT: 13.2 % (ref 10–15)
GFR SERPL CREATININE-BSD FRML MDRD: >90 ML/MIN/1.73M2
GLUCOSE BLD-MCNC: 114 MG/DL (ref 70–125)
HCT VFR BLD AUTO: 43.6 % (ref 40–53)
HGB BLD-MCNC: 14.4 G/DL (ref 13.3–17.7)
IMM GRANULOCYTES # BLD: 0 10E3/UL
IMM GRANULOCYTES NFR BLD: 0 %
LYMPHOCYTES # BLD AUTO: 1.1 10E3/UL (ref 0.8–5.3)
LYMPHOCYTES NFR BLD AUTO: 13 %
MCH RBC QN AUTO: 29.9 PG (ref 26.5–33)
MCHC RBC AUTO-ENTMCNC: 33 G/DL (ref 31.5–36.5)
MCV RBC AUTO: 91 FL (ref 78–100)
MONOCYTES # BLD AUTO: 0.7 10E3/UL (ref 0–1.3)
MONOCYTES NFR BLD AUTO: 8 %
NEUTROPHILS # BLD AUTO: 6 10E3/UL (ref 1.6–8.3)
NEUTROPHILS NFR BLD AUTO: 72 %
NRBC # BLD AUTO: 0 10E3/UL
NRBC BLD AUTO-RTO: 0 /100
PLATELET # BLD AUTO: 216 10E3/UL (ref 150–450)
POTASSIUM BLD-SCNC: 4.5 MMOL/L (ref 3.5–5)
RBC # BLD AUTO: 4.81 10E6/UL (ref 4.4–5.9)
SODIUM SERPL-SCNC: 139 MMOL/L (ref 136–145)
WBC # BLD AUTO: 8.3 10E3/UL (ref 4–11)

## 2023-03-13 PROCEDURE — 36415 COLL VENOUS BLD VENIPUNCTURE: CPT | Performed by: EMERGENCY MEDICINE

## 2023-03-13 PROCEDURE — 99284 EMERGENCY DEPT VISIT MOD MDM: CPT | Mod: 25

## 2023-03-13 PROCEDURE — 85025 COMPLETE CBC W/AUTO DIFF WBC: CPT | Performed by: EMERGENCY MEDICINE

## 2023-03-13 PROCEDURE — 82310 ASSAY OF CALCIUM: CPT | Performed by: EMERGENCY MEDICINE

## 2023-03-13 PROCEDURE — 70490 CT SOFT TISSUE NECK W/O DYE: CPT

## 2023-03-13 ASSESSMENT — ACTIVITIES OF DAILY LIVING (ADL): ADLS_ACUITY_SCORE: 35

## 2023-03-13 NOTE — ED TRIAGE NOTES
Patient reports that @1700 today he was taking one pill and believes that it is stuck in his throat, he points to his Bob's apple where he believes it is stuck, reports that he can swallow mall amounts of water, not in respiratory distress.  Joselin Zimmerman RN.......3/13/2023 5:40 PM     Triage Assessment     Row Name 03/13/23 4718       Triage Assessment (Adult)    Airway WDL WDL       Respiratory WDL    Respiratory WDL WDL       Skin Circulation/Temperature WDL    Skin Circulation/Temperature WDL WDL       Cardiac WDL    Cardiac WDL WDL       Peripheral/Neurovascular WDL    Peripheral Neurovascular WDL WDL       Cognitive/Neuro/Behavioral WDL    Cognitive/Neuro/Behavioral WDL WDL

## 2023-03-13 NOTE — ED PROVIDER NOTES
EMERGENCY DEPARTMENT ENCOUNTER            IMPRESSION:  Upper esophageal foreign body sensation      MEDICAL DECISION MAKING:  It was my pleasure to provide care for Alisa Dubose who presented for evaluation of suspected upper esophageal foreign body.     Patient is pleasant and cooperative    On exam vital signs are normal.  He is tolerating secretions.  There is no stridor.    He was given oral intake to wash the possible pill    Significant laboratory findings include normal CBC and chemistry    Imaging independently reviewed and agree with radiologist findings of normal CT soft tissue of the neck without evidence of foreign body    Results were discussed with the patient and his parent.    I suspect the pill has dissolved or will dissolve over the next 24 hours.    He can come back here if he has ongoing symptoms tomorrow          =================================================================  CHIEF COMPLAINT:  Chief Complaint   Patient presents with     Foreign Body Sensation         HPI  Alisa Dubose is a 43 year old male with a history of esophageal dysphagia, obstruction of esophagus, esophagitis, gastritis, and GERD who presents to the ED by private car for evaluation of foreign body sensation.    Patient reports that he was taking a pill at 5:00 PM today and he believes that it is stuck in his throat. Patient reports that he attempted to flush the pill down with water but he still feels like the pill is in his throat. Patient denies shortness of breath or any other concerns at this time.    I, Caesar Dahl am serving as a scribe to document services personally performed by Dr. Tae Briggs MD, based on my observation and the provider's statements to me. I, Dr. Tae Briggs MD attest that Caesar Dahl is acting in a scribe capacity, has observed my performance of the services and has documented them in accordance with my direction.      REVIEW OF SYSTEMS   Constitutional: Does not report  chills, unintentional weight loss or fatigue   Eyes: Does not report visual changes or discharge    HENT: Endorses foreign body sensation in throat. Does not report sore throat, ear pain or neck pain  Respiratory: Does not report cough or shortness of breath    Cardiovascular: Does not report chest pain, palpitations or leg swelling  GI: Does not report abdominal pain, nausea, vomiting, or dark, bloody stools.    : Does not report hematuria, dysuria, or flank pain  Musculoskeletal: Does not report any new musculoskeletal pain or new muscle/joint pains  Skin: Does not report rash or wound  Neurologic: Does not report current headache, new weakness, focal weakness, or sensory changes        Remainder of systems reviewed, unless noted in HPI all others negative.      PAST MEDICAL HISTORY:  Past Medical History:   Diagnosis Date     Asperger syndrome      Asperger syndrome      Asthma      Eosinophilic esophagitis      Gastric ulcer      Gastric ulcer      GERD (gastroesophageal reflux disease)      Hypercholesteremia      Hypertension      Hypertension      Stricture of esophagus      Varicose veins        PAST SURGICAL HISTORY:  Past Surgical History:   Procedure Laterality Date     ESOPHAGOSCOPY, GASTROSCOPY, DUODENOSCOPY (EGD), COMBINED N/A 11/22/2016    Procedure: ESOPHAGOGASTRODUODENOSCOPY;  Surgeon: Tom Seaman MD;  Location: [facility];  Service:      NOSE SURGERY       TEAR DUCT SURGERY  10/1981         CURRENT MEDICATIONS:    atenolol (TENORMIN) 25 MG tablet  omeprazole 20 MG tablet  simvastatin (ZOCOR) 20 MG tablet        ALLERGIES:  No Known Allergies    FAMILY HISTORY:  No family history on file.    SOCIAL HISTORY:   Social History     Socioeconomic History     Marital status: Single   Tobacco Use     Smoking status: Never     Smokeless tobacco: Never   Substance and Sexual Activity     Alcohol use: Yes       PHYSICAL EXAM:    /80   Pulse 87   Temp 98.7  F (37.1  C) (Oral)   Resp 18   Ht  "1.905 m (6' 3\")   Wt 99.8 kg (220 lb)   SpO2 99%   BMI 27.50 kg/m      Constitutional: Awake, alert, in no acute distress  Head: Normocephalic, atraumatic.  ENT: Mucous membranes moist. Posterior oropharynx appears normal.  Tolerating oral intake no stridor  Eyes: Pupils midrange and reactive ,no conjunctival discharge  Neck: No lymphadenopathy, no stridor, supple, no soft tissue swelling  Chest: No tenderness   Respiratory: Respirations even, unlabored. Lungs clear to ascultation bilaterally, in no acute respiratory distress.  Cardiovascular: Regular rate and rhythm.+2 radial pulses, equal bilaterally.  No murmurs.   GI: Abdomen soft, non-tender to palpation in all 4 quadrants. No guarding or rebound. Bowel sounds intact on all 4 quadrants.   Back: No CVA tenderness.    Musculoskeletal: Moves all 4 extremities equally, strength symmetrical on bilateral uppers and lowers.  No peripheral edema  Integument: Warm, dry. No rash. No bruising or petechiae.  Lymphatic: No cervical lymphadenopathy  Neurologic: Alert & oriented x 3. Normal speech. Grossly normal motor and sensory function. No focal deficits noted.  NIHSS = 0  Psychiatric: Normal mood and affect. Normal judgement.    ED COURSE:    6:54 PM I performed my initial interview and exam.  8:36 PM I rechecked and updated the patient prior to discharge.        Medical Decision Making    History:    Supplemental history from: Parent    External Record(s) reviewed: External medical records including care everywhere reviewed    Work Up:    Laboratory and imaging studies independently reviewed by the provider    Broad differential diagnosis considered for upper airway obstruction      External consultation:        Complicating factors:    Patient has a complicated past medical history including as progressing    Care affected by social determinants of health: Access to primary care    Disposition considerations: Disposition involved in shared decision-making with the " patient, patient instructed to continue outpatient medication as prescribed, referred for follow-up,              LAB:  Laboratory results were independently reviewed and interpreted  Results for orders placed or performed during the hospital encounter of 03/13/23   CT Soft Tissue Neck w/o Contrast    Impression    IMPRESSION:   1.  No radiopaque foreign body.  2.  No acute inflammatory process or mass lesion.   Basic metabolic panel   Result Value Ref Range    Sodium 139 136 - 145 mmol/L    Potassium 4.5 3.5 - 5.0 mmol/L    Chloride 103 98 - 107 mmol/L    Carbon Dioxide (CO2) 25 22 - 31 mmol/L    Anion Gap 11 5 - 18 mmol/L    Urea Nitrogen 8 8 - 22 mg/dL    Creatinine 0.72 0.70 - 1.30 mg/dL    Calcium 9.5 8.5 - 10.5 mg/dL    Glucose 114 70 - 125 mg/dL    GFR Estimate >90 >60 mL/min/1.73m2   CBC with platelets and differential   Result Value Ref Range    WBC Count 8.3 4.0 - 11.0 10e3/uL    RBC Count 4.81 4.40 - 5.90 10e6/uL    Hemoglobin 14.4 13.3 - 17.7 g/dL    Hematocrit 43.6 40.0 - 53.0 %    MCV 91 78 - 100 fL    MCH 29.9 26.5 - 33.0 pg    MCHC 33.0 31.5 - 36.5 g/dL    RDW 13.2 10.0 - 15.0 %    Platelet Count 216 150 - 450 10e3/uL    % Neutrophils 72 %    % Lymphocytes 13 %    % Monocytes 8 %    % Eosinophils 6 %    % Basophils 1 %    % Immature Granulocytes 0 %    NRBCs per 100 WBC 0 <1 /100    Absolute Neutrophils 6.0 1.6 - 8.3 10e3/uL    Absolute Lymphocytes 1.1 0.8 - 5.3 10e3/uL    Absolute Monocytes 0.7 0.0 - 1.3 10e3/uL    Absolute Eosinophils 0.5 0.0 - 0.7 10e3/uL    Absolute Basophils 0.0 0.0 - 0.2 10e3/uL    Absolute Immature Granulocytes 0.0 <=0.4 10e3/uL    Absolute NRBCs 0.0 10e3/uL         RADIOLOGY:  Radiology reports were independently reviewed and interpreted  CT Soft Tissue Neck w/o Contrast   Final Result   IMPRESSION:    1.  No radiopaque foreign body.   2.  No acute inflammatory process or mass lesion.             MEDICATIONS GIVEN IN THE EMERGENCY:  Medications - No data to display        NEW  PRESCRIPTIONS STARTED AT TODAY'S ER VISIT:  Discharge Medication List as of 3/13/2023  8:41 PM             Prior to making a final disposition on this patient the results of patient's tests and other diagnostic studies were discussed with the patient. All questions were answered. Patient expressed understanding of the plan and was amenable to it.      FINAL DIAGNOSIS:    ICD-10-CM    1. Foreign body sensation in throat  R09.89                  NAME: Alisa Dubose  AGE: 43 year old male  YOB: 1980  MRN: [id number]  EVALUATION DATE & TIME: 3/13/2023  6:36 PM    PCP: [facility]    ED PROVIDER: Tae Briggs M.D.      Caesar ARTHUR, am serving as a scribe to document services personally performed by Dr. Tae Briggs based on my observation and the provider's statements to me. Tae ARTHUR MD attest that Caesar Dahl is acting in a scribe capacity, has observed my performance of the services and has documented them in accordance with my direction.    Tae Briggs M.D.  Emergency Medicine  [facility] EMERGENCY ROOM  [address] [facility] [address]  [phone]  Dept: [phone]  3/13/2023         Tae Briggs MD  03/14/23 0009

## 2023-03-14 ENCOUNTER — HOSPITAL ENCOUNTER (EMERGENCY)
Facility: CLINIC | Age: 43
Discharge: HOME | End: 2023-03-14
Attending: EMERGENCY MEDICINE | Admitting: EMERGENCY MEDICINE
Payer: COMMERCIAL

## 2023-03-14 VITALS
RESPIRATION RATE: 20 BRPM | SYSTOLIC BLOOD PRESSURE: 152 MMHG | DIASTOLIC BLOOD PRESSURE: 81 MMHG | BODY MASS INDEX: 27.35 KG/M2 | HEIGHT: 75 IN | TEMPERATURE: 97.3 F | OXYGEN SATURATION: 100 % | HEART RATE: 92 BPM | WEIGHT: 220 LBS

## 2023-03-14 VITALS
SYSTOLIC BLOOD PRESSURE: 177 MMHG | HEIGHT: 75 IN | OXYGEN SATURATION: 99 % | DIASTOLIC BLOOD PRESSURE: 96 MMHG | TEMPERATURE: 97.8 F | WEIGHT: 220 LBS | HEART RATE: 81 BPM | BODY MASS INDEX: 27.35 KG/M2 | RESPIRATION RATE: 18 BRPM

## 2023-03-14 DIAGNOSIS — R09.A2 GLOBUS PHARYNGEUS: ICD-10-CM

## 2023-03-14 LAB
FLUAV RNA SPEC QL NAA+PROBE: NEGATIVE
FLUBV RNA RESP QL NAA+PROBE: NEGATIVE
GROUP A STREP BY PCR: NOT DETECTED
RSV RNA SPEC NAA+PROBE: NEGATIVE
SARS-COV-2 RNA RESP QL NAA+PROBE: NEGATIVE

## 2023-03-14 PROCEDURE — C9803 HOPD COVID-19 SPEC COLLECT: HCPCS

## 2023-03-14 PROCEDURE — 99283 EMERGENCY DEPT VISIT LOW MDM: CPT | Mod: CS

## 2023-03-14 PROCEDURE — 87637 SARSCOV2&INF A&B&RSV AMP PRB: CPT | Performed by: EMERGENCY MEDICINE

## 2023-03-14 PROCEDURE — 87651 STREP A DNA AMP PROBE: CPT | Performed by: EMERGENCY MEDICINE

## 2023-03-14 PROCEDURE — 250N000013 HC RX MED GY IP 250 OP 250 PS 637: Performed by: EMERGENCY MEDICINE

## 2023-03-14 RX ORDER — DIPHENHYDRAMINE HCL 25 MG
25 CAPSULE ORAL ONCE
Status: COMPLETED | OUTPATIENT
Start: 2023-03-14 | End: 2023-03-14

## 2023-03-14 RX ADMIN — DIPHENHYDRAMINE HYDROCHLORIDE 25 MG: 25 CAPSULE ORAL at 12:28

## 2023-03-14 NOTE — ED NOTES
"Pt was able to drink fluids and eat small amount of food without vomiting. Pt reports having the sensation of something still \"stuck\".   "
electronic

## 2023-03-14 NOTE — ED TRIAGE NOTES
Patient reports throat pain since yesterday 8/10 and describes muscle spasms.  Able to tolerate secretions, able to drink water. Difficulty swallowing foods.      Triage Assessment     Row Name 03/14/23 1153       Skin Circulation/Temperature WDL    Skin Circulation/Temperature WDL WDL       Cardiac WDL    Cardiac WDL X  HTN       Peripheral/Neurovascular WDL    Peripheral Neurovascular WDL WDL

## 2023-03-14 NOTE — ED PROVIDER NOTES
"EMERGENCY DEPARTMENT ENCOUNTER      NAME: Alisa Dubose  AGE: 43 year old male  YOB: 1980  MRN: [id number]  EVALUATION DATE & TIME: No admission date for patient encounter.    PCP: [facility], [facility]    ED PROVIDER: Tom Hernández M.D.      Chief Complaint   Patient presents with     Pharyngitis         FINAL IMPRESSION:  No diagnosis found.      ED COURSE & MEDICAL DECISION MAKING:    Pertinent Labs & Imaging studies reviewed. (See chart for details)  43 year old male presents to the Emergency Department for evaluation of \"spasms in his throat when I swallow\".  Patient seen earlier this morning for reports of foreign body sensation in his throat.  Patient had requested \"I need scoping\".  Patient seen yesterday for similar symptomatology.  Laboratory evaluation and CAT scan of the soft tissue of the neck were unremarkable.  Patient with history of Asperger's making his understanding of the medical issues challenging.  Patient was swabbed earlier for multiple viral processes were negative.  He returned because of \"spasms\".  Patient reassured once again.  Recommended to stay in liquid diet for the next few days to allow his symptoms to resolve.  He understood this.. Patient appears non toxic with stable vitals signs. Overall exam is benign.        12:37 PM I met with the patient for the initial interview and physical examination. Discussed plan for treatment and workup in the ED.      At the conclusion of the encounter I discussed the results of all of the tests and the disposition. The questions were answered and return precautions provided. The patient or family acknowledged understanding and was agreeable with the care plan.     Medical Decision Making    History:    Supplemental history from: Documented in chart, if applicable    External Record(s) reviewed: Documented in chart, if applicable.    Work Up:    Chart documentation includes differential considered and any EKGs or imaging " independently interpreted by provider, where specified.    In additional to work up documented, I considered the following work up: Documented in chart, if applicable.    External consultation:    Discussion of management with another provider: Documented in chart, if applicable    Complicating factors:    Care impacted by chronic illness: Mental Health    Care affected by social determinants of health: Literacy    Disposition considerations: Discharge. No recommendations on prescription strength medication(s). See documentation for any additional details.           PPE: Provider wore gloves, N95 mask, eye protection    MEDICATIONS GIVEN IN THE EMERGENCY:  Medications - No data to display    NEW PRESCRIPTIONS STARTED AT TODAY'S ER VISIT  New Prescriptions    No medications on file          =================================================================    HPI    Patient information was obtained from: Patient    Use of Intrepreter: N/A        Alisa Dubose is a 43 year old male with a pertient medical history of Asperger's, GERD, HTN, HLD, who presents to the ED for evaluation of a sore throat.    Patient endorses a sore throat which has been ongoing since yesterday. He describes his pain as 8/10 in severity. Patient states that he is now having throat spasms when he tries to swallow solid foods, but that he is still able to tolerate liquids.    Patient denies all other complaints at this time.      REVIEW OF SYSTEMS   Constitutional:  Denies fever, chills  HENT: Positive for sore throat.  Respiratory:  Denies productive cough or increased work of breathing  Cardiovascular:  Denies chest pain, palpitations  GI:  Denies abdominal pain, nausea, vomiting, or change in bowel or bladder habits   Musculoskeletal:  Denies any new muscle/joint swelling  Skin:  Denies rash   Neurologic:  Denies focal weakness  All systems negative except as marked.     PAST MEDICAL HISTORY:  Past Medical History:   Diagnosis Date      "Asperger syndrome      Asperger syndrome      Asthma      Eosinophilic esophagitis      Gastric ulcer      Gastric ulcer      GERD (gastroesophageal reflux disease)      Hypercholesteremia      Hypertension      Hypertension      Stricture of esophagus      Varicose veins        PAST SURGICAL HISTORY:  Past Surgical History:   Procedure Laterality Date     ESOPHAGOSCOPY, GASTROSCOPY, DUODENOSCOPY (EGD), COMBINED N/A 11/22/2016    Procedure: ESOPHAGOGASTRODUODENOSCOPY;  Surgeon: Tom Seaman MD;  Location: [facility];  Service:      NOSE SURGERY       TEAR DUCT SURGERY  10/1981         CURRENT MEDICATIONS:    No current facility-administered medications for this encounter.    Current Outpatient Medications:      atenolol (TENORMIN) 25 MG tablet, Take 25 mg by mouth daily, Disp: , Rfl:      omeprazole 20 MG tablet, Take 20 mg by mouth daily, Disp: , Rfl:      simvastatin (ZOCOR) 20 MG tablet, Take 20 mg by mouth At Bedtime, Disp: , Rfl:     ALLERGIES:  No Known Allergies    FAMILY HISTORY:  No family history on file.    SOCIAL HISTORY:   Social History     Socioeconomic History     Marital status: Single   Tobacco Use     Smoking status: Never     Smokeless tobacco: Never   Substance and Sexual Activity     Alcohol use: Yes       VITALS:  Patient Vitals for the past 24 hrs:   BP Temp Temp src Pulse Resp SpO2 Height Weight   03/14/23 1152 (!) 152/81 97.3  F (36.3  C) Temporal 92 20 100 % 1.905 m (6' 3\") 99.8 kg (220 lb)        PHYSICAL EXAM    Constitutional:  Awake, alert, in mild apparent distress  HENT:  Normocephalic, Atraumatic. Bilateral external ears normal. Oropharynx moist. Nose normal. Neck- Normal range of motion with no guarding, Supple, No stridor.   Eyes:  PERRL, EOMI with no signs of entrapment, Conjunctiva normal, No discharge.   Respiratory:  Normal breath sounds, No respiratory distress, No wheezing.    Cardiovascular:  Normal heart rate, Normal rhythm, No appreciable rubs or gallops.   GI:  Soft, " No tenderness, No distension, No palpable masses  Musculoskeletal:  No edema. Good range of motion in all major joints. No tenderness to palpation or major deformities noted.  Integument:  Warm, Dry, No erythema, No rash.   Neurologic:  Alert & oriented, Normal motor function, Normal sensory function, No focal deficits noted.   Psychiatric: Anxious        I, Benton Mclain, am serving as a scribe to document services personally performed by Tom Hernández MD, based on my observation and the provider's statements to me. I, Tom Hernández MD attest that Benton Mclain is acting in a scribe capacity, has observed my performance of the services and has documented them in accordance with my direction.    Tom Hernández M.D.  Emergency Medicine  [facility] EMERGENCY ROOM      Tom Hernández MD  03/14/23 4598

## 2023-03-14 NOTE — ED PROVIDER NOTES
"EMERGENCY DEPARTMENT ENCOUNTER      NAME: Alisa Dubose  AGE: 43 year old male  YOB: 1980  MRN: [id number]  EVALUATION DATE & TIME: No admission date for patient encounter.    PCP: Richard Barillas [facility]    ED PROVIDER: Tom Hernández M.D.      Chief Complaint   Patient presents with     Pharyngitis         FINAL IMPRESSION:  No diagnosis found.      ED COURSE & MEDICAL DECISION MAKING:    Pertinent Labs & Imaging studies reviewed. (See chart for details)  43 year old male presents to the Emergency Department for reevaluation of throat discomfort.  Patient reports he still \"gagging on things\".  He states \"I need a scoping\".  Patient seen yesterday for foreign body sensation after difficulties swallowing a pill per review of records.  He denies any fevers or chills.  However information is limited secondary to patient's profound Asperger's.  On exam he is a slightly anxious male.  Posterior oropharynx with drying of the mucosa and minimal injection.  Likely related to chronic mouth breathing.  Lungs are clear.  No stridor.  Review of records from yesterday indicate patient had extensive laboratory evaluation and CT of the neck due to prior issues esophagitis.  Results were normal.  Out of caution patient will be swabbed for strep, COVID, influenza, RSV.  Given recent laboratory evaluation and imaging no indications for repeating the studies. Patient appears non toxic with stable vitals signs. Overall exam is benign.      7:17 AM I met with the patient for the initial interview and physical examination. Discussed plan for treatment and workup in the ED.    8:11 AM.  Swab for RSV/COVID/influenza is negative.  Awaiting strep screen  8:18 AM.  Strep screen negative.  Patient reassured in regards to findings.  Patient educated regarding the need for continuous good hydration.  Patient is primarily mouth breathing causing drying of his mucosa and his symptomatology.  The use of sugarless candies " also discussed to help moisturize the throat.  At the conclusion of the encounter I discussed the results of all of the tests and the disposition. The questions were answered and return precautions provided. The patient or family acknowledged understanding and was agreeable with the care plan.     Medical Decision Making    History:    Supplemental history from: Documented in chart, if applicable    External Record(s) reviewed: Documented in chart, if applicable.    Work Up:    Chart documentation includes differential considered and any EKGs or imaging independently interpreted by provider, where specified.    In additional to work up documented, I considered the following work up: Documented in chart, if applicable.    External consultation:    Discussion of management with another provider: Documented in chart, if applicable    Complicating factors:    Care impacted by chronic illness: Other: Asperger's, esophagitis    Care affected by social determinants of health: Literacy    Disposition considerations: Discharge. No recommendations on prescription strength medication(s). See documentation for any additional details.           PPE: Provider wore gloves, N95 mask, eye protection    MEDICATIONS GIVEN IN THE EMERGENCY:  Medications - No data to display    NEW PRESCRIPTIONS STARTED AT TODAY'S ER VISIT  New Prescriptions    No medications on file          =================================================================    HPI    Patient information was obtained from: Patient    Use of Intrepreter: N/A        Alisa Dubose is a 43 year old male with a pertient medical history of Asperger's, GERD, HTN, HLD, who presents to the ED for evaluation of a sore throat.    Per chart review, patient was seen in this ED yesterday for evaluation of a foreign body sensation in his throat, saying that he believes he got a pill stuck in his throat. CT scan without any foreign body, no acute inflammatory process or mass  "lesion.    Patient endorses a sore throat that has been persisting since yesterday, saying it feels as if something is stuck in his throat. He reports that he is gagging when he eats and swallows but is able to swallow foods. Patient states \"I think I need a scoping.\"    Patient denies all other complaints at this time.      REVIEW OF SYSTEMS   Constitutional:  Denies fever, chills  HENT: Positive for sore throat  Respiratory:  Denies productive cough or increased work of breathing  Cardiovascular:  Denies chest pain, palpitations  GI:  Denies abdominal pain, nausea, vomiting, or change in bowel or bladder habits   Musculoskeletal:  Denies any new muscle/joint swelling  Skin:  Denies rash   Neurologic:  Denies focal weakness  All systems negative except as marked.     PAST MEDICAL HISTORY:  Past Medical History:   Diagnosis Date     Asperger syndrome      Asperger syndrome      Asthma      Eosinophilic esophagitis      Gastric ulcer      Gastric ulcer      GERD (gastroesophageal reflux disease)      Hypercholesteremia      Hypertension      Hypertension      Stricture of esophagus      Varicose veins        PAST SURGICAL HISTORY:  Past Surgical History:   Procedure Laterality Date     ESOPHAGOSCOPY, GASTROSCOPY, DUODENOSCOPY (EGD), COMBINED N/A 11/22/2016    Procedure: ESOPHAGOGASTRODUODENOSCOPY;  Surgeon: Tom Seaman MD;  Location: [facility];  Service:      NOSE SURGERY       TEAR DUCT SURGERY  10/1981         CURRENT MEDICATIONS:    No current facility-administered medications for this encounter.    Current Outpatient Medications:      atenolol (TENORMIN) 25 MG tablet, Take 25 mg by mouth daily, Disp: , Rfl:      omeprazole 20 MG tablet, Take 20 mg by mouth daily, Disp: , Rfl:      simvastatin (ZOCOR) 20 MG tablet, Take 20 mg by mouth At Bedtime, Disp: , Rfl:     ALLERGIES:  No Known Allergies    FAMILY HISTORY:  History reviewed. No pertinent family history.    SOCIAL HISTORY:   Social History " "    Socioeconomic History     Marital status: Single     Spouse name: None     Number of children: None     Years of education: None     Highest education level: None   Tobacco Use     Smoking status: Never     Smokeless tobacco: Never   Substance and Sexual Activity     Alcohol use: Yes       VITALS:  Patient Vitals for the past 24 hrs:   BP Temp Temp src Pulse Resp SpO2 Height Weight   03/14/23 0659 -- -- -- -- -- -- 1.905 m (6' 3\") 99.8 kg (220 lb)   03/14/23 0655 (!) 177/96 97.8  F (36.6  C) Oral 81 18 99 % -- --        PHYSICAL EXAM    Constitutional:  Awake, alert, in mild apparent distress  HENT:  Normocephalic, Atraumatic. Bilateral external ears normal. Mucosa dry posteriorly with minimal injection. Nose normal. Neck- Normal range of motion with no guarding, No midline cervical tenderness, Supple, No stridor.   Eyes:  PERRL, EOMI with no signs of entrapment, Conjunctiva normal, No discharge.   Respiratory:  Normal breath sounds, no stridor, No respiratory distress, No wheezing.    Cardiovascular:  Normal heart rate, Normal rhythm, No appreciable rubs or gallops.   Musculoskeletal:  Intact distal pulses, No edema. Good range of motion in all major joints. No tenderness to palpation or major deformities noted.  Integument:  Warm, Dry, No erythema, No rash.   Neurologic:  Alert & oriented, Normal motor function, Normal sensory function, No focal deficits noted.   Psychiatric:  Affect normal, Judgment normal, Mood normal.     LAB:  All pertinent labs reviewed and interpreted.  Results for orders placed or performed during the hospital encounter of 03/14/23   Symptomatic Influenza A/B, RSV, & SARS-CoV2 PCR (COVID-19) Nasopharyngeal     Status: Normal    Specimen: Nasopharyngeal; Swab   Result Value Ref Range    Influenza A PCR Negative Negative    Influenza B PCR Negative Negative    RSV PCR Negative Negative    SARS CoV2 PCR Negative Negative    Narrative    Testing was performed using the [employer] Xpress " CoV2/Flu/RSV Assay on the [employer] Instrument. This test should be ordered for the detection of SARS-CoV-2, influenza, and RSV viruses in individuals who meet clinical and/or epidemiological criteria. Test performance is unknown in asymptomatic patients. This test is for in vitro diagnostic use under the FDA EUA for laboratories certified under CLIA to perform high or moderate complexity testing. This test has not been FDA cleared or approved. A negative result does not rule out the presence of PCR inhibitors in the specimen or target RNA in concentration below the limit of detection for the assay. If only one viral target is positive but coinfection with multiple targets is suspected, the sample should be re-tested with another FDA cleared, approved, or authorized test, if coinfection would change clinical management. This test was validated by the [facility]. These laboratories are certified under the Clinical Laboratory Improvement Amendments of 1988 (CLIA-88) as qualified to perform high complexity laboratory testing.   Group A Streptococcus PCR Throat Swab     Status: Normal    Specimen: Throat; Swab   Result Value Ref Range    Group A strep by PCR Not Detected Not Detected    Narrative    The Xpert Xpress Strep A test, performed on the [employer]  Instrument Systems, is a rapid, qualitative in vitro diagnostic test for the detection of Streptococcus pyogenes (Group A ß-hemolytic Streptococcus, Strep A) in throat swab specimens from patients with signs and symptoms of pharyngitis. The Xpert Xpress Strep A test can be used as an aid in the diagnosis of Group A Streptococcal pharyngitis. The assay is not intended to monitor treatment for Group A Streptococcus infections. The Xpert Xpress Strep A test utilizes an automated real-time polymerase chain reaction (PCR) to detect Streptococcus pyogenes DNA.           Benton ARTHUR, am serving as a scribe to document services personally  performed by Tom Hernández MD, based on my observation and the provider's statements to me. I, Tom Hernández MD attest that Benotn Mclain is acting in a scribe capacity, has observed my performance of the services and has documented them in accordance with my direction.    Tom Hernández M.D.  Emergency Medicine  [facility] EMERGENCY ROOM      Tom Hernández MD  03/14/23 7899

## 2023-03-14 NOTE — DISCHARGE INSTRUCTIONS
No evidence of foreign body on exam or scan  Pill should dissolve in 24 hours  Return to the emergency department tomorrow if you still have abnormal sensation

## 2023-03-14 NOTE — DISCHARGE INSTRUCTIONS
Your symptoms are likely related to breathing through your mouth and dry air.  You need to try sugarless candies to keep your throat moist.

## 2023-09-23 ENCOUNTER — HOSPITAL ENCOUNTER (EMERGENCY)
Facility: CLINIC | Age: 43
Discharge: HOME | End: 2023-09-23
Attending: STUDENT IN AN ORGANIZED HEALTH CARE EDUCATION/TRAINING PROGRAM | Admitting: STUDENT IN AN ORGANIZED HEALTH CARE EDUCATION/TRAINING PROGRAM
Payer: COMMERCIAL

## 2023-09-23 VITALS
HEART RATE: 102 BPM | BODY MASS INDEX: 29.84 KG/M2 | RESPIRATION RATE: 18 BRPM | SYSTOLIC BLOOD PRESSURE: 140 MMHG | DIASTOLIC BLOOD PRESSURE: 80 MMHG | TEMPERATURE: 98.3 F | OXYGEN SATURATION: 100 % | WEIGHT: 240 LBS | HEIGHT: 75 IN

## 2023-09-23 DIAGNOSIS — R11.10 VOMITING, UNSPECIFIED VOMITING TYPE, UNSPECIFIED WHETHER NAUSEA PRESENT: ICD-10-CM

## 2023-09-23 DIAGNOSIS — T18.128A ESOPHAGEAL OBSTRUCTION DUE TO FOOD IMPACTION: ICD-10-CM
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
## 2023-09-23 PROCEDURE — 99283 EMERGENCY DEPT VISIT LOW MDM: CPT

## 2023-09-23 RX ORDER — BUDESONIDE 3 MG/1
3 CAPSULE, COATED PELLETS ORAL 2 TIMES DAILY
Qty: 60 CAPSULE | Refills: 0 | Status: SHIPPED | OUTPATIENT
Start: 2023-09-23 | End: 2023-10-23

## 2023-09-23 NOTE — ED PROVIDER NOTES
EMERGENCY DEPARTMENT ENCOUNTER       ED Course & Medical Decision Making     5:57 PM I met with the patient to obtain patient history and performed a physical exam. Discussed plan for ED work up including potential diagnostic studies and interventions.    Final Impression  43 year old male presents for evaluation of esophageal obstruction.  Reports he was eating pizza earlier tonight and felt it get stuck in his throat.  Patient reports a history of esophageal foreign bodies in the past, looks like he has had multiple endoscopies.  Review of last [facility] note from 8/28 as documented below, documents longstanding history of eosinophilic esophagitis on chronic PPI daily and perpetuity, has been prescribed steroids in the past including Flovent inhaler and oral budesonide, though not currently on any and per GI notes sounds like there is questionable compliance with the steroids.  When asked patient about the budesonide, he states that he has not picked it up from the pharmacy yet, will send a new prescription for capsules to try to ensure his compliance.  Shortly after arrival in the ED patient vomited up a small amount of food including the offending chunk of pizza.  Then was able to drink some liquids without difficulty and was asking for discharge.  Will be discharged home.  Continue PPI, follow-up with GI, start taking the budesonide    Prior to making a final disposition on this patient the results of patient's tests and other diagnostic studies were discussed with the patient. All questions were answered. Patient expressed understanding of the plan and was amenable to it.    Medical Decision Making    History:  Supplemental history from: None  External Record(s) reviewed as documented below;  8/28/2023 [facility] note, documents known eosinophilic esophagitis, documents issues with compliance of PPI and topical steroid treatment in the past.  Documents multiple endoscopies over the year, typically  when symptoms worsen, usually ends up getting esophageal dilations with help for a few months at least.  Last endoscopy was April/2023, biopsies confirmed presence of eosinophilic esophagitis.  Eosinophil counts extremely high at every single biopsy. Prescribed oral budesonide and omeprazole.     Work Up:  Chart documentation includes differential considered and any EKGs or imaging independently interpreted by provider, where specified.  In additional to work up documented, I considered the following work up: Considered CT chest to evaluate for ongoing esophageal obstruction.  Considered Glucagon and EZ gas to help clear potential obstruction but patient vomited up offending agent shortly after arrival.   DDx considered but not limited to: Esophageal obstruction, globus sensation, esophageal food bolus, esophageal stricture, eosinophilic esophagitis     Complicating factors:  Care impacted by chronic illness: Other: Eosinophilic esophagitis  Care affected by social determinants of health: Medication Noncompliance    Disposition considerations: Discharge. I prescribed additional prescription strength medication(s) as charted. I considered admission, but discharged patient after significant clinical improvement.      Medications - No data to display    New Prescriptions    BUDESONIDE (ENTOCORT EC) 3 MG EC CAPSULE    Take 1 capsule (3 mg) by mouth 2 times daily for 30 days       Final Impression     1. Esophageal obstruction due to food impaction    2. Vomiting, unspecified vomiting type, unspecified whether nausea present          Chief Complaint     Chief Complaint   Patient presents with    Swallowed Foreign Body     Pt was eating pizza tonight when he felt like a piece got stuck in his throat. Tried to swallow some water but was unable to. He is c/o throat pain and saying that it feels harder to breath like normal. Has had food stuck in his throat before.    HPI     Alisa Dubose is a 43 year old male who  "presents for evaluation of foreign body in throat.    Patient reports about 25 minutes PTA (~5:45 PM), he was eating pizza and felt like a piece got stuck in his throat. He tried to drink water but was unable to swallow it. He endorses throat pain secondary to the foreign object. He endorses history of eosinophilic esophagitis and states that he's had frequent esophagus obstructions before. Upon initial exam, patient has vomited on the ground and feels like the food is unstuck and nothing is now lodged in his throat. There were no other concerns/complaints at this time.    I, Rachel Ac am serving as a scribe to document services personally performed by Dr. Jose Dean MD, based on my observation and the provider's statements to me. I, Dr. Jose Dean MD attest that Rachel Shen is acting in a scribe capacity, has observed my performance of the services and has documented them in accordance with my direction.    Physical Exam     BP (!) 144/84   Pulse 112   Temp 98.3  F (36.8  C) (Oral)   Resp 22   Ht 1.905 m (6' 3\")   Wt 108.9 kg (240 lb)   SpO2 100%   BMI 30.00 kg/m    Constitutional: Awake, alert, in no acute distress.  Small amount of vomiting material on floor.  Patient asking for some Sprite to drink.  Head: Normocephalic, atraumatic.  ENT: Mucous membranes moist.  Eyes: Conjunctiva normal.  Respiratory: Respirations even, unlabored, in no acute respiratory distress.  Cardiovascular: Regular rate and rhythm. Good peripheral perfusion.  GI: Abdomen soft, non-tender.  Musculoskeletal: Moves all 4 extremities equally.  Integument: Warm, dry.  Neurologic: Alert & oriented x 3. Normal speech. Grossly normal motor and sensory function. No focal deficits noted.  Psychiatric: Normal mood    Labs & Imaging        Jose Dean MD  09/23/23 1823    "

## 2023-09-23 NOTE — ED TRIAGE NOTES
Pt was eating pizza tonight when he felt like a piece got stuck in his throat. Tried to swallow some water but was unable to. He is c/o throat pain and saying that it feels harder to breath like normal. Has had food stuck in his throat before.

## 2024-10-15 ENCOUNTER — HOSPITAL ENCOUNTER (EMERGENCY)
Facility: CLINIC | Age: 44
Discharge: HOME | End: 2024-10-15
Attending: EMERGENCY MEDICINE | Admitting: EMERGENCY MEDICINE

## 2024-10-15 VITALS
RESPIRATION RATE: 18 BRPM | HEART RATE: 109 BPM | TEMPERATURE: 98 F | DIASTOLIC BLOOD PRESSURE: 66 MMHG | BODY MASS INDEX: 31.08 KG/M2 | OXYGEN SATURATION: 96 % | HEIGHT: 75 IN | WEIGHT: 250 LBS | SYSTOLIC BLOOD PRESSURE: 150 MMHG

## 2024-10-15 DIAGNOSIS — R09.A2 GLOBUS PHARYNGEUS: Primary | ICD-10-CM

## 2024-10-15 DIAGNOSIS — R09.A2 GLOBUS SENSATION: ICD-10-CM

## 2024-10-15 PROCEDURE — 99283 EMERGENCY DEPT VISIT LOW MDM: CPT

## 2024-10-15 ASSESSMENT — ENCOUNTER SYMPTOMS
VOICE CHANGE: 0
SHORTNESS OF BREATH: 0
TROUBLE SWALLOWING: 1

## 2024-10-15 ASSESSMENT — COLUMBIA-SUICIDE SEVERITY RATING SCALE - C-SSRS
2. HAVE YOU ACTUALLY HAD ANY THOUGHTS OF KILLING YOURSELF IN THE PAST MONTH?: NO
1. IN THE PAST MONTH, HAVE YOU WISHED YOU WERE DEAD OR WISHED YOU COULD GO TO SLEEP AND NOT WAKE UP?: NO
6. HAVE YOU EVER DONE ANYTHING, STARTED TO DO ANYTHING, OR PREPARED TO DO ANYTHING TO END YOUR LIFE?: NO

## 2024-10-15 NOTE — DISCHARGE INSTRUCTIONS
Placed a GI referral for you.  Follow-up primary care doctor.  Return to the ER for worsening symptoms

## 2024-10-15 NOTE — ED TRIAGE NOTES
Pt arrives to ED with c/o food stuck in throat since about 1240 this afternoon. Pt endorses to having toast and it wont go down. States he is unable to drink water, able to breath and talk. Oxygenation stable. Pt states this has happened to him before and he needs to get his esophagus widened.      Triage Assessment (Adult)       Row Name 10/15/24 1342          Triage Assessment    Airway WDL X  food stuck in throat        Respiratory WDL    Respiratory WDL WDL        Skin Circulation/Temperature WDL    Skin Circulation/Temperature WDL WDL        Cardiac WDL    Cardiac WDL WDL        Peripheral/Neurovascular WDL    Peripheral Neurovascular WDL WDL        Cognitive/Neuro/Behavioral WDL    Cognitive/Neuro/Behavioral WDL WDL

## 2024-10-15 NOTE — ED PROVIDER NOTES
EMERGENCY DEPARTMENT ENCOUNTER      NAME: Alisa Dubose  AGE: 44 year old male  YOB: 1980  MRN: [id number]  EVALUATION DATE & TIME: No admission date for patient encounter.    PCP: [facility]    ED PROVIDER: Colton Osborn MD      Chief Complaint   Patient presents with    Food Stuck in Throat         FINAL IMPRESSION:  1. Globus pharyngeus    2. Globus sensation          ED COURSE & MEDICAL DECISION MAKING:    Pertinent Labs & Imaging studies reviewed. (See chart for details)  44 year old male presents to the Emergency Department for evaluation of foreign body sensation    History of acidophilic esophagitis and previous dilations.  Is compliant with Meprazole but not taking his steroids.  Ate some toast and feels he got stuck.  I met him he feels good got better and is requesting Sprite.  Given Lea mist and tolerated Lea mist without any difficulty.  Patient states his symptoms have resolved and he is back to normal    Based on history and exam I do not feel CT imaging is indicated.     ED Course as of 10/15/24 1607   Tue Oct 15, 2024   1408 Foreign body sensation has been seen in ER in the last few years for similar presentation.  Looks like he was seen by [facility] in August 2023 where his diagnosis and a feeling esophagitis and had questionable compliance with his steroids.  Looks like per that note in August 2023 with [facility] that he has had dilations of esophagus in the past   1421 Globus sensation.  Has not been taking his medications steroids prescribed secondary to lack of insurance.  Never did he got his Indiclor prescription from last September.   1422 Thinks he passed a.  Has a muffled voice which she says is baseline.  On exam PTA, RPA, epiglottitis unlikely.  No stridor.  No drooling.   1422 Patient requesting Sprite   1606 Patient tolerated Lea mist and feels back to baseline.   1606 Plan for discharge home with referral to GI for evaluation of  known eosinophilic esophagitis and not taking his medications    1606 Likely food bolus impaction resolved   1607 No evidence of airway obstruction   1607 Doubt ACS     2:33 PM I met the patient and performed my initial interview and exam. Discussed workup in the emergency department, management of symptoms, and likely disposition.      Medical Decision Making  Obtained supplemental history:Supplemental history obtained?: No  Reviewed external records: External records reviewed?: Documented in chart  Care impacted by chronic illness:Other: GERD, eosinophilic esophagitis   Did you consider but not order tests?: Work up considered but not performed and documented in chart, if applicable  Did you interpret images independently?: Independent interpretation of ECG and images noted in documentation, when applicable.  Consultation discussion with other provider:Did you involve another provider (consultant, , pharmacy, etc.)?: No  Discharge. No recommendations on prescription strength medication(s). I considered admission, but ultimately discharged patient resolution of globus sensation.    MIPS: Not Applicable            At the conclusion of the encounter I discussed the results of all of the tests and the disposition. The questions were answered. The patient or family acknowledged understanding and was agreeable with the care plan.         MEDICATIONS GIVEN IN THE EMERGENCY:  Medications - No data to display      NEW PRESCRIPTIONS STARTED AT TODAY'S ER VISIT  New Prescriptions    No medications on file          =================================================================    HPI    Patient information was obtained from: Patient     Use of : N/A         Alisa Dubose is a 44 year old male with a pertinent history of GERD and eosinophilic esophagitis who presents to this ED by walk in for evaluation of dysphagia.     At 12:40 PM this afternoon, the patient endorsed having a piece of toast stuck in his  throat and vomiting secondary to this. In the ED, he has been able to drink water and states that the food has gone down okay. Patient feels completely back to normal, stating that his voice is also back to normal. He denies throat pain or difficulty breathing.     The patient has a history of eosinophilic esophagitis. He was prescribed omeprazole 40 mg but his insurance was invalidated, so he now takes over the counter omeprazole (40 mg) once a day. Patient is also supposed to be on budesonide and Indiclor since he last saw his GI in September 2023 but never picked up these prescriptions.     The patient currently does not have insurance but states he plans to get back on insurance.     He has no other concerns at this time.       REVIEW OF SYSTEMS   Review of Systems   HENT:  Positive for trouble swallowing (resolved). Negative for voice change.         Negative for throat pain.   Respiratory:  Negative for shortness of breath.         PAST MEDICAL HISTORY:  Past Medical History:   Diagnosis Date    Asperger syndrome     Asperger syndrome     Asthma     Eosinophilic esophagitis     Gastric ulcer     Gastric ulcer     GERD (gastroesophageal reflux disease)     Hypercholesteremia     Hypertension     Hypertension     Stricture of esophagus     Varicose veins        PAST SURGICAL HISTORY:  Past Surgical History:   Procedure Laterality Date    ESOPHAGOSCOPY, GASTROSCOPY, DUODENOSCOPY (EGD), COMBINED N/A 11/22/2016    Procedure: ESOPHAGOGASTRODUODENOSCOPY;  Surgeon: Tom Seaman MD;  Location: [facility];  Service:     NOSE SURGERY      TEAR DUCT SURGERY  10/1981           CURRENT MEDICATIONS:    atenolol (TENORMIN) 25 MG tablet  budesonide (ENTOCORT EC) 3 MG EC capsule  omeprazole 20 MG tablet  simvastatin (ZOCOR) 20 MG tablet        ALLERGIES:  No Known Allergies    FAMILY HISTORY:  History reviewed. No pertinent family history.    SOCIAL HISTORY:   Social History     Socioeconomic History    Marital status:  "Single   Tobacco Use    Smoking status: Never    Smokeless tobacco: Never   Substance and Sexual Activity    Alcohol use: Yes       VITALS:  BP (!) 150/66   Pulse 109   Temp 98  F (36.7  C) (Temporal)   Resp 18   Ht 1.905 m (6' 3\")   Wt 113.4 kg (250 lb)   SpO2 96%   BMI 31.25 kg/m      PHYSICAL EXAM      Vitals: BP (!) 150/66   Pulse 109   Temp 98  F (36.7  C) (Temporal)   Resp 18   Ht 1.905 m (6' 3\")   Wt 113.4 kg (250 lb)   SpO2 96%   BMI 31.25 kg/m    General: Appears in no acute distress, awake, alert, interactive, muffled voice which patient states is his normal, asking for Sprite.  Eyes: Conjunctivae non-injected. Sclera anicteric.  HENT: Atraumatic. No trismus or drooling.  No stridor.   Neck: Supple. No neck tenderness.  Respiratory/Chest: Respiration unlabored. No stridor.  Abdomen: non distended  Musculoskeletal: Normal extremities. No edema or erythema.  Skin: Normal color. No rash or diaphoresis.  Neurologic: Face symmetric, moves all extremities spontaneously. Speech clear.  Psychiatric: Oriented to person, place, and time. Affect appropriate.    LAB:  All pertinent labs reviewed and interpreted.       RADIOLOGY:  Reviewed all pertinent imaging. Please see official radiology report.  No orders to display           I, Flory Mixon, am serving as a scribe to document services personally performed by oClton Osborn MD based on my observation and the provider's statements to me. I, Colton Osborn MD, attest that Flory Mixon is acting in a scribe capacity, has observed my performance of the services and has documented them in accordance with my direction.    Colton Osborn MD  [facility] EMERGENCY ROOM  [address] [facility] [id number] [phone]      Colton Osborn MD  10/15/24 0968    "

## 2025-03-08 ENCOUNTER — HOSPITAL ENCOUNTER (EMERGENCY)
Facility: CLINIC | Age: 45
Discharge: HOME | End: 2025-03-08
Attending: EMERGENCY MEDICINE | Admitting: EMERGENCY MEDICINE

## 2025-03-08 VITALS
OXYGEN SATURATION: 98 % | DIASTOLIC BLOOD PRESSURE: 73 MMHG | SYSTOLIC BLOOD PRESSURE: 139 MMHG | TEMPERATURE: 97.8 F | RESPIRATION RATE: 22 BRPM | HEART RATE: 106 BPM

## 2025-03-08 DIAGNOSIS — R09.A2 GLOBUS SENSATION: ICD-10-CM

## 2025-03-08 PROCEDURE — 250N000013 HC RX MED GY IP 250 OP 250 PS 637: Performed by: EMERGENCY MEDICINE

## 2025-03-08 PROCEDURE — 99283 EMERGENCY DEPT VISIT LOW MDM: CPT

## 2025-03-08 RX ADMIN — ANTACID/ANTIFLATULENT 4 G: 380; 550; 10; 10 GRANULE, EFFERVESCENT ORAL at 18:22

## 2025-03-09 NOTE — ED TRIAGE NOTES
Walked into the ER complaining of having food stuck in his throat. Patient states he was eating lasagna and some of it got stuck in his throat.  Patient speaking in full sentences airway patent. No respiratory symptoms. History of similar episode recently.      Triage Assessment (Adult)       Row Name 03/08/25 1814          Triage Assessment    Airway WDL X        Respiratory WDL    Respiratory WDL WDL        Skin Circulation/Temperature WDL    Skin Circulation/Temperature WDL WDL        Cardiac WDL    Cardiac WDL X;rhythm     Pulse Rate & Regularity tachycardic        Peripheral/Neurovascular WDL    Peripheral Neurovascular WDL WDL        Cognitive/Neuro/Behavioral WDL    Cognitive/Neuro/Behavioral WDL WDL

## 2025-03-09 NOTE — ED PROVIDER NOTES
EMERGENCY DEPARTMENT ENCOUNTER      NAME: Alisa Dubose  AGE: 45 year old male  YOB: 1980  MRN: [id number]  EVALUATION DATE & TIME: 3/8/2025  6:09 PM    PCP: [facility]    ED PROVIDER: Wendy Nassar MD    Chief Complaint   Patient presents with    Airway Obstruction         FINAL IMPRESSION:  1. Globus sensation          ED COURSE & MEDICAL DECISION MAKING:    Pertinent Labs & Imaging studies reviewed. (See chart for details)  45 year old male with history of HTN, HLD, Asperger's, asthma, EOE and previous esophageal stricture who presents to the Emergency Department for evaluation of esophageal foreign body sensation after he was eating lasagna at 545 this evening and felt something got stuck.  His exam is really unremarkable.  Could be related to esophageal food impaction versus stricture versus globus sensation.  Patient is on antacid, but is no longer on budesonide.    Patient is simply requesting GI to come in and do an upper endoscopy.  Informed that they will not do this until he has failed other methods.  Was instructed on EZ gas and jump follow thereafter and if this fails we will trial glucagon.  Patient given dose of EZ gas, able to swallow it and stated he feels much better.  Discharged home.  Seems that he is having recurrent episodes like this was encouraged to follow-up with GI to discuss whether he should be on budesonide chronically.      ED Course as of 03/08/25 1833   Sat Mar 08, 2025   1832 pt says he thinks he swallowed it and feels better. We did e-z gas anyway. He says symptoms have resolved.        Medical Decision Making  Obtained supplemental history:Supplemental history obtained?: No  Reviewed external records: External records reviewed?: No  Care impacted by chronic illness:Hyperlipidemia, Hypertension, and Mental Health  Did you consider but not order tests?: Work up considered but not performed and documented in chart, if applicable  Did you  "interpret images independently?: Independent interpretation of ECG and images noted in documentation, when applicable.  Consultation discussion with other provider:Did you involve another provider (consultant, , pharmacy, etc.)?: No  Discharge. I recommended the patient continue their current prescription strength medication(s): Protonix. See documentation for any additional details.    MIPS: Not Applicable      At the conclusion of the encounter I discussed the results of all of the tests and the disposition. The questions were answered. The patient or family acknowledged understanding and was agreeable with the care plan.      MEDICATIONS GIVEN IN THE EMERGENCY:  Medications   sod bicarbonate-citric acid-simethicone (EZ GAS) 2.21-1.53-0.04 g packet 4 g (4 g Oral $Given 3/8/25 5101)       NEW PRESCRIPTIONS STARTED AT TODAY'S ER VISIT  New Prescriptions    No medications on file          =================================================================    HPI    Patient information was obtained from: Patient    Use of Intrepreter: N/A      Alisacrystal Dubose is a 45 year old male with pertinent medical history of HTN, HLD, Asperger's, asthma, EOE and previous esophageal stricture who presents with foreign body sensation.  Patient states that approximately 545 this evening he was eating lasagna.  Felt like it got stuck in his throat.  No respiratory symptoms.  Feels similar to previous episodes when he comes to the ER.  Patient states \"I just need that GI doctor to come in and do a scope\".  Patient is taking omeprazole daily, but is not on budesonide.      PAST MEDICAL HISTORY:  Past Medical History:   Diagnosis Date    Asperger syndrome     Asperger syndrome     Asthma     Eosinophilic esophagitis     Gastric ulcer     Gastric ulcer     GERD (gastroesophageal reflux disease)     Hypercholesteremia     Hypertension     Hypertension     Stricture of esophagus     Varicose veins        PAST SURGICAL HISTORY:  Past " Surgical History:   Procedure Laterality Date    ESOPHAGOSCOPY, GASTROSCOPY, DUODENOSCOPY (EGD), COMBINED N/A 11/22/2016    Procedure: ESOPHAGOGASTRODUODENOSCOPY;  Surgeon: Tom Seaman MD;  Location: [facility];  Service:     NOSE SURGERY      TEAR DUCT SURGERY  10/1981       CURRENT MEDICATIONS:    Prior to Admission Medications   Prescriptions Last Dose Informant Patient Reported? Taking?   atenolol (TENORMIN) 25 MG tablet   Yes No   Sig: Take 25 mg by mouth daily   budesonide (ENTOCORT EC) 3 MG EC capsule   No No   Sig: Take 1 capsule (3 mg) by mouth 2 times daily for 30 days   omeprazole 20 MG tablet   Yes No   Sig: Take 20 mg by mouth daily   simvastatin (ZOCOR) 20 MG tablet   Yes No   Sig: Take 20 mg by mouth At Bedtime      Facility-Administered Medications: None       ALLERGIES:  No Known Allergies    FAMILY HISTORY:  No family history on file.    SOCIAL HISTORY:  Social History     Tobacco Use    Smoking status: Never    Smokeless tobacco: Never   Substance Use Topics    Alcohol use: Yes        VITALS:  Patient Vitals for the past 24 hrs:   BP Temp Temp src Pulse Resp SpO2   03/08/25 1815 139/73 -- -- 106 -- 98 %   03/08/25 1813 (!) 166/83 97.8  F (36.6  C) Oral 98 22 99 %       PHYSICAL EXAM    General Appearance: Well-appearing, blunted mood and affect  ENT:  membranes are moist without pallor  Neck:  Supple  Cardio:  Regular rate and rhythm  Pulm:  No respiratory distress, clear to auscultation bilaterally  Abdomen:  Soft, non-tender, obese  Extremities: Normal gait  Neuro:  Alert and oriented ×3     RADIOLOGY/LABS:  Reviewed all pertinent imaging. Please see official radiology report. All pertinent labs reviewed and interpreted.           Wendy Nassar MD  Emergency Medicine  [facility] EMERGENCY ROOM  [address], [place] [id number] [phone]  Dept: [phone]     Wendy Nassar MD  03/08/25 1833

## 2025-05-12 ENCOUNTER — HOSPITAL ENCOUNTER (EMERGENCY)
Facility: CLINIC | Age: 45
Discharge: HOME | End: 2025-05-12

## 2025-05-12 VITALS
TEMPERATURE: 97.7 F | OXYGEN SATURATION: 98 % | BODY MASS INDEX: 28.39 KG/M2 | HEART RATE: 86 BPM | SYSTOLIC BLOOD PRESSURE: 135 MMHG | RESPIRATION RATE: 20 BRPM | WEIGHT: 227.1 LBS | DIASTOLIC BLOOD PRESSURE: 75 MMHG

## 2025-05-12 DIAGNOSIS — T18.128A ESOPHAGEAL OBSTRUCTION DUE TO FOOD IMPACTION: ICD-10-CM

## 2025-05-12 DIAGNOSIS — R09.A2 GLOBUS PHARYNGEUS: ICD-10-CM
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
## 2025-05-12 PROCEDURE — 99283 EMERGENCY DEPT VISIT LOW MDM: CPT

## 2025-05-12 RX ORDER — ONDANSETRON 2 MG/ML
4 INJECTION INTRAMUSCULAR; INTRAVENOUS ONCE
Status: COMPLETED | OUTPATIENT
Start: 2025-05-12 | End: 2025-05-12

## 2025-05-12 ASSESSMENT — COLUMBIA-SUICIDE SEVERITY RATING SCALE - C-SSRS
1. IN THE PAST MONTH, HAVE YOU WISHED YOU WERE DEAD OR WISHED YOU COULD GO TO SLEEP AND NOT WAKE UP?: NO
6. HAVE YOU EVER DONE ANYTHING, STARTED TO DO ANYTHING, OR PREPARED TO DO ANYTHING TO END YOUR LIFE?: NO
2. HAVE YOU ACTUALLY HAD ANY THOUGHTS OF KILLING YOURSELF IN THE PAST MONTH?: NO

## 2025-05-12 ASSESSMENT — ACTIVITIES OF DAILY LIVING (ADL)
ADLS_ACUITY_SCORE: 41
ADLS_ACUITY_SCORE: 41

## 2025-05-12 NOTE — DISCHARGE INSTRUCTIONS
You are seen here for concern of an esophageal food impaction.  You are able to dislodge the food.  Able tolerate oral secretions.  Referral made out to GI.  Please follow-up with them.  Please continue to take omeprazole as discussed.  You can increase to 40 mg as discussed daily..  Present to the emergency department if you develop fevers, trouble breathing, vomiting, severe abdominal pain or throat pain.

## 2025-05-12 NOTE — ED PROVIDER NOTES
EMERGENCY DEPARTMENT ENCOUNTER      NAME: Alisa Dubose  AGE: 45 year old male  YOB: 1980  MRN: [id number]  EVALUATION DATE & TIME: 5/12/2025 12:53 PM    PCP: [facility]    ED PROVIDER: Junior Wheeler MD    FINAL IMPRESSION:  1. Globus pharyngeus    2. Esophageal obstruction due to food impaction        ED COURSE & MEDICAL DECISION MAKING:    Pertinent Labs & Imaging studies reviewed. (See chart for details)  45 year old male presents to the Emergency Department for evaluation of esophageal stricture.  Differential diagnosis considered esophageal food impaction, aspiration pneumonia, ACS, PE, pneumothorax.     Triage Note: Pt was eating a burger just PTA. A piece got stuck in his throat. He has had this happen with food in the past. Pt sounds muffled in triage and is vomiting profusely.         ED Course as of 05/12/25 1854   Mon May 12, 2025   1410 I met with the patient to obtain patient history and performed a physical exam. Discussed plan for ED work up including potential diagnostic studies and interventions.    Patient with history of eosinophilic esophagitis and past food impactions and dilations.  Was eating hamburger today and felt like he had an esophageal food impaction.  He tried to make himself vomit but actually vomited prior to my arrival and dislodge the food impaction.  On my assessment patient is well-appearing nontoxic controlling his secretions.  Was able to drink water.  No foreign body or esophageal food impaction on my assessment.  His lungs were clear to auscultation.  No respiratory distress or stridor.  100% on room air on my assessment.  No acute distress.  Will refer to [facility].  Encouraged him to increase his ppi.    Given his dramatic improvement damply chest x-ray is necessary in this clinical scenario low suspicion for pneumothorax or aspiration pneumonitis.  Will make referral to GI and follow-up in outpatient setting.  Patient agreeable with  plan.      We discussed the plan for discharge and the patient is agreeable. Reviewed supportive cares, symptomatic treatment, outpatient follow up, and reasons to return to the Emergency Department. Patient to be discharged by ED RN.          Not Applicable    I discussed the plan for discharge with the patient and patient is agreeable. We discussed supportive cares at home and reasons to return to the ER including new or worsening symptoms. All questions and concerns addressed to the best of my ability. Strict return precautions discussed. Patient to be discharge by RN.    At the conclusion of the encounter I discussed the results of the tests and the disposition. The questions were answered. The patient or family acknowledged understanding and was agreeable with the care plan.     MEDICATIONS GIVEN IN THE EMERGENCY:  Medications   ondansetron (ZOFRAN) injection 4 mg (4 mg Intravenous Not Given 5/12/25 1345)       NEW PRESCRIPTIONS STARTED AT TODAY'S ER VISIT  Discharge Medication List as of 5/12/2025  2:21 PM        Discharge Medication List as of 5/12/2025  2:21 PM          =================================================================    HPI    Alisa Dubose is a 45 year old male with a pertinent history of eosinophilic esophagitis, hypertension, and GERD who presents to this ED for evaluation of esophageal stricture.    Patient reports eating a hamburger at 12:20 PM today and feeling that it was stuck in his throat. He started vomiting profusely in the ED and now feels that the food is not stuck anymore. States he has had this happen in the past due to eosinophilic esophagitis and was last dilated 2 years ago. Takes omeprazole 40 mg and has not missed an doses recently. Requesting a referral to [facility]. There were no other concerns/complaints at this time.       PHYSICAL EXAM    /75   Pulse 86   Temp 97.7  F (36.5  C) (Temporal)   Resp 20   Wt 103 kg (227 lb 1.6 oz)   SpO2 98%   BMI 28.39 kg/m     Constitutional: Comfortable appearing. Able to handle secretions and tolerate drinking water.  Head: Normocephalic, atraumatic, mucous membranes moist, nose normal.   Neck: Supple, gross ROM intact.   Eyes: Pupils mid-range, sclera white.  Respiratory: Clear to auscultation bilaterally, no respiratory distress, no wheezing, speaks full sentences easily.  Cardiovascular: Normal heart rate, regular rhythm, no murmurs. No lower extremity edema.   GI: Soft, no tenderness to deep palpation in all quadrants.  Musculoskeletal: Moving all 4 extremities intentionally and without pain. No obvious deformity.  Skin: Warm, dry, no rash.  Neurologic: Alert & oriented x 3, speech clear, moving all extremities spontaneously   Psychiatric: Affect normal, cooperative.       LAB:  All pertinent labs reviewed and interpreted.       RADIOLOGY:  Reviewed all pertinent imaging. Please see official radiology report.  No orders to display       EKG:    N/A        Junior Wheeler MD  [facility] EMERGENCY ROOM  [facility] [facility] [id number] [phone]   =================================================================    BILLING:  Data  Category 1  Non-ED record review, if applicable. External record reviewed: Reviewed past visit for globus sensation on 3/8/2025.     Clinical information was obtained from an independent historian. History was obtained from: Patient     The following testing was considered but ultimately not selected after discussion with patient/family: Consider chest x-ray as above     Category 2  My independent interpretation of EKG, rhythm strip, radiology study: N/A     Category 3  Discussion of management with other physician/healthcare provider/other source: N/A       Risk  Prescription medication was considered, but ultimately not given after discussion with patient/family: N/A     Chronic conditions affecting care: Hypertension and Eosinophilic esophagitis      Consideration of Admission/Observation: Escalation of care including admission/observation was considered given the complexity and risk of the patient's presenting complaint, exam findings, and/or their underlying comorbidities. However, ultimately I feel the patient is safe for outpatient management with close follow up. Reasoning: Work-up reassuring, does not reveal any acute life/organ threatening processes, patient's symptoms well controlled upon reevaluation, reexamination is reassuring, vitals are stable, patient agreeable with discharge, reliable for follow-up.   Considered admission for esophageal food impaction however this was dislodged prior to my assessment.      I, Mariana Medrano, am serving as a scribe to document services personally performed by Junior Wheeler MD based on my observation and the provider's statements to me. I, Junior Wheeler MD, attest that Mariana Medrano is acting in a scribe capacity, has observed my performance of the services and has documented them in accordance with my direction.     Junior Wheeler MD  05/12/25 6615

## 2025-05-12 NOTE — ED TRIAGE NOTES
Pt was eating a burger just PTA. A piece got stuck in his throat. He has had this happen with food in the past. Pt sounds muffled in triage and is vomiting profusely.      Triage Assessment (Adult)       Row Name 05/12/25 1249          Triage Assessment    Airway WDL WDL        Respiratory WDL    Respiratory WDL X;rhythm/pattern     Rhythm/Pattern, Respiratory tachypneic;labored        Skin Circulation/Temperature WDL    Skin Circulation/Temperature WDL WDL        Cardiac WDL    Cardiac WDL X;rhythm     Pulse Rate & Regularity tachycardic        Peripheral/Neurovascular WDL    Peripheral Neurovascular WDL WDL        Cognitive/Neuro/Behavioral WDL    Cognitive/Neuro/Behavioral WDL WDL

## 2025-06-17 ENCOUNTER — TRANSFERRED RECORDS (OUTPATIENT)
Dept: ADMINISTRATIVE | Facility: CLINIC | Age: 45
End: 2025-06-17

## 2025-06-19 NOTE — PROCEDURES
[facility]   [address], [address], [place] [address]     Patient Name: Alisa Dubose  Gender:  Male  Exam Date: 06/17/2025 Visit Number:  [id number]  Age: 45 Years YOB: 1980  Attending MD: Rupert Ham MD Medical Record#:  [id number]  -----------------------------------------------------------------------------------------------------------------------------   Procedure:    Upper GI Endoscopy   Indications:    Eosinophilic Esophagitis   Dysphagia on omeprazole 40 mgs daily  Provider:        Rupert Ham MD   Referring MD: Junior Wheeler MD   Primary MD:      MD Arita Primary  Medications:   Admitting Medication:   0.9% Normal Saline at TKO   Intra Procedure Medications:   Patient received monitored anesthesia care.     Complications: No immediate complications  ___________________________________________________________________________________________  Procedure:   An examination of the heart and lungs was performed within acceptable limits.  . The patient was therefore deemed a reasonable candidate for sedation.   The risks and benefits were explained to the patient, who appeared to understand. After obtaining informed consent, the scope was passed under direct vision. Throughout the procedure the patient's blood pressure, pulse and oxygen saturations were monitored.  The scope was introduced through the mouth and advanced to the second portion of duodenum.         Findings:   Esophagus:    Ringed esophagus   Location: entire esophagus; description: mild  Maneuver: biopsies were obtained by cold biopsy forceps.  The z-line is 45 centimeters from the incisors.  Top of the gastric folds is 45 centimeters from the incisors.     Schatzki's ring. Location - GE junction. Size of opening - 9 mm.  Dilatation of the Esophagus:  Instrument:  Balloon, size:  13 mm, 14 mm, 15 mm.  Impression: The dilation of the esophagus was successful, there was mild resistance with minor  "bleeding. There was no chest or abdominal pain after dilatation.  *Esophagus Comments:  There was a narrowing noted at the GE junction.  Initially there was resistance to passing the endoscope with a \"pop\" due to gentle pressure the endoscope was able to get through.  After this the stricture was dilated with a balloon to 15 mm max diameter.  Good disruption of the fibrous tissue there  Stomach:    Normal stomach.  The diaphragm hiatus is at 45 centimeters from the incisors.  Duodenum:    Normal duodenum.  Impression:   Eosinophilic esophagitis    Preliminary Plan:  Recommendation Comments:  You need to be seen in the esophagus clinic again.  I worry that the omeprazole medicine alone is not controlling your eosinophilic esophagitis.  The office will be calling you to schedule an appointment in clinic.  For now continue the omeprazole.  Pathology Results:  A: ESOPHAGUS, BIOPSY:           1. Continued involvement by eosinophilic esophagitis (peak of 80 eosinophils/HPF), see               comment           2. Negative for columnar mucosa      COMMENTS  A. The patient's history of eosinophilic esophagitis is noted, see biopsy history below.    Peak eosinophil counts per high power field:   - 06/17/2025: 80 (on omeprazole 40 mg QD, current biopsy)  - 04/26/2023: 70 (on omeprazole 40 mg QD)  - 03/22/2023: >100 (on high dose proton-pump inhibitor)  - 03/22/2021: 50 (on high dose PPI (discontinued topical steroid 4 months ago))  - 09/11/2020: 30 (omeprazole 40 mg once daily, as well as Flovent inhaler 2 puffs twice a day)  - 12/15/2016: 40   - 05/31/2007: 40      MICROSCOPIC  A: The histologic findings include:               Intercellular edema: Marked         Eosinophil microabscess: Present          Eosinophils surface layering: Absent         Extracellular eosinophil granules: Present     Electronically signed by: Ricardo Dawkins MD    Interpreted at [facility], [address] [place] [address], [place], " [place] [address]    Final Plan:  If you have not had an office visit scheduled or other arrangements made with your provider, please contact our office.  Additional Comments:  A: ESOPHAGUS, BIOPSY:          1. Continued involvement by eosinophilic esophagitis (peak of 80 eosinophils/HPF), see              comment          2. Negative for columnar mucosa      COMMENTS  A. The patient's history of eosinophilic esophagitis is noted, see biopsy history below.    Peak eosinophil counts per high power field:   - 06/17/2025: 80 (on omeprazole 40 mg QD, current biopsy)  - 04/26/2023: 70 (on omeprazole 40 mg QD)  - 03/22/2023: >100 (on high dose proton-pump inhibitor)  - 03/22/2021: 50 (on high dose PPI (discontinued topical steroid 4 months ago))  - 09/11/2020: 30 (omeprazole 40 mg once daily, as well as Flovent inhaler 2 puffs twice a day)  - 12/15/2016: 40   - 05/31/2007: 40      _Electronically signed by:___________________  Rupert Ham MD                 06/17/2025    cc: MD No Primary  cc: Junior Wheeler MD